# Patient Record
Sex: FEMALE | Race: WHITE | NOT HISPANIC OR LATINO | URBAN - METROPOLITAN AREA
[De-identification: names, ages, dates, MRNs, and addresses within clinical notes are randomized per-mention and may not be internally consistent; named-entity substitution may affect disease eponyms.]

---

## 2019-02-07 ENCOUNTER — EMERGENCY (EMERGENCY)
Facility: HOSPITAL | Age: 29
LOS: 0 days | Discharge: HOME | End: 2019-02-07
Admitting: PHYSICIAN ASSISTANT

## 2019-02-07 VITALS
SYSTOLIC BLOOD PRESSURE: 127 MMHG | TEMPERATURE: 98 F | RESPIRATION RATE: 18 BRPM | HEART RATE: 97 BPM | DIASTOLIC BLOOD PRESSURE: 58 MMHG | OXYGEN SATURATION: 100 %

## 2019-02-07 DIAGNOSIS — H60.91 UNSPECIFIED OTITIS EXTERNA, RIGHT EAR: ICD-10-CM

## 2019-02-07 DIAGNOSIS — H92.09 OTALGIA, UNSPECIFIED EAR: ICD-10-CM

## 2019-02-07 DIAGNOSIS — Z79.899 OTHER LONG TERM (CURRENT) DRUG THERAPY: ICD-10-CM

## 2019-02-07 RX ORDER — NEOMYCIN/POLYMYXIN B/HYDROCORT
2 SUSPENSION, DROPS(FINAL DOSAGE FORM)(ML) OTIC (EAR)
Qty: 1 | Refills: 0
Start: 2019-02-07 | End: 2019-02-13

## 2019-02-07 NOTE — ED PROVIDER NOTE - NSFOLLOWUPINSTRUCTIONS_ED_ALL_ED_FT
Otitis Externa    Otitis externa is a bacterial or fungal infection of the outer ear canal. This is the area from the eardrum to the outside of the ear. Otitis externa is sometimes called "swimmer's ear." Causes include swimming in dirty water, moisture remaining in ear after swimming or bathing, trauma to the ear, objects stuck in the ear, or cuts or scrapes in the outside of the ear. Symptoms include itching, pain, swelling, redness in the ear canal, and fluid coming from the ear. To prevent recurrence of otitis externa, keep your ear dry, avoid scratching or putting objects inside your ear including cotton swabs, and avoid swimming in polluted water or poorly chlorinated pools.    SEEK IMMEDIATE MEDICAL CARE IF YOU HAVE ANY OF THE FOLLOWING SYMPTOMS: fever, worsening symptoms, headache, redness/swelling/pain over the bone behind your ear.

## 2019-02-07 NOTE — ED PROVIDER NOTE - OBJECTIVE STATEMENT
pt presents c/o right ear pain for 2 weeks  pain is sharp, constant, nonradiating, without known exac or relieving factors  pt works as , so likely sick contacts, but not definite  denies recent travel  Denies fever/chill/HA/dizziness/chest pain/palpitation/sob/abd pain/n/v/d/ black stool/bloody stool/urinary sxs

## 2019-02-07 NOTE — ED PROVIDER NOTE - PROGRESS NOTE DETAILS
if hearing changes : see ENT right away or return to ER, discussed possible side effects of abx.   Meningitis, malignant otitis, mastoiditis, considered, not supported.

## 2019-02-07 NOTE — ED PROVIDER NOTE - PHYSICAL EXAMINATION
CONSTITUTIONAL: Well-appearing; well-nourished; in no apparent distress.   ENT: +right ear canal erythema and narrowing, nl TM without effusion or erythema; left ear wnl; normal nose; no rhinorrhea; normal pharynx with no tonsillar hypertrophy.   NECK: Supple; non-tender; no cervical lymphadenopathy. No JVD.   NEURO/PSYCH: A & O x 4; grossly unremarkable. mood and manner are appropriate. Grooming and personal hygiene are appropriate. No apparent thoughts of harm to self or others.

## 2019-02-07 NOTE — ED PROVIDER NOTE - NSFOLLOWUPCLINICS_GEN_ALL_ED_FT
An ENT Doctor  Otolaryngology (ENT)  .  [place]   Phone:   Fax:   Follow Up Time:     [facility]  ENT  [address], [address]  [place] [address]  Phone: [phone]  Fax:   Follow Up Time:

## 2019-02-08 PROBLEM — Z00.00 ENCOUNTER FOR PREVENTIVE HEALTH EXAMINATION: Status: ACTIVE | Noted: 2019-02-08

## 2019-10-21 NOTE — ED ADULT NURSE NOTE - PAIN: PRESENCE, MLM
OFFICE VISIT      Patient: Raysa Lilly Date of Service: 10/21/2019   : 1971 MRN: [id number]     SUBJECTIVE:     Chief Complaint   Patient presents with   • Follow-up     1 month f/u        HISTORY OF PRESENT ILLNESS:  Raysa Lilly is a 48 year old female who presents today for:    1. C/o swelling and pain in left hand similar to the one she had in 2019  The swelling is over base of 2nd and 3rd fingers  Had Xray of the left hand in 2019, negative for erosions  No prior h/o RA or other systemic form of arthritis  Knees and ankle hurt as well    2. HS-  Needs refill on norco  Needs refill on clindamycin oral and local for when having active lesions  Denies fever or any draining/active ones at present time    3. DM2  Has been taking basaglar 30units qhs and metformin bid  Insurance stopped paying for her contour tests strips  Needs a new rx for a new machine    Declines Flu shot      REVIEW OF SYSTEMS:  Review of Systems   All other systems reviewed and are negative.      MEDICATIONS:  Current Outpatient Medications   Medication Sig   • HYDROcodone-acetaminophen (NORCO)  MG per tablet Take 1 tablet by mouth every 8 hours as needed for Pain.   • predniSONE (DELTASONE) 10 MG tablet Take 4 tabl PO qd for 2 days, then 3 tabl PO qd for 2 days, then 2 tabl PO qd for 2 days, then 1 tabl PO qd for 2 days   • clindamycin (CLINDAGEL) 1 % gel Apply topically 2 times daily.   • clindamycin (CLEOCIN) 300 MG capsule Take 1 capsule by mouth 2 times daily for 14 days.   • ketoconazole (NIZORAL) 2 % shampoo apply to affected area, leave on for 5 min, rinse; apply 2x/wk   • ALPRAZolam (XANAX) 2 MG tablet Take 1 tablet by mouth 2 times daily as needed for Anxiety. TAKE 1 TABLET TWICE DAILY.   • fluticasone-salmeterol (AIRDUO RESPICLICK) 113-14 MCG/ACT inhaler Inhale 1 puff into the lungs 2 times daily.   • budesonide-formoterol (SYMBICORT) 160-4.5 MCG/ACT inhaler Inhale 2 puffs into the lungs 2 times daily.    • albuterol 108 (90 Base) MCG/ACT inhaler Inhale 2 puffs into the lungs every 4 hours as needed for Shortness of Breath or Wheezing.   • insulin glargine (LANTUS SOLOSTAR) 100 UNIT/ML pen-injector Inject 30 Units into the skin nightly.   • metformin (GLUCOPHAGE) 1000 MG tablet Take 1 tablet by mouth 2 times daily (with meals). TAKE 1 TABLET BY MOUTH TWICE DAILY   • atorvastatin (LIPITOR) 10 MG tablet Take 1 tablet by mouth daily. TAKE 1 TABLET BY MOUTH EVERY DAY   • blood glucose (ZAHRAA CONTOUR NEXT TEST) test strip TEST THREE TIMES A DAY   • LANCETS ULTRA FINE Misc USE AS DIRECTED.   • Insulin Syringe 31G X 5/16\" 0.5 ML Misc USE AS DIRECTED once a day with lantus insulin   • Blood Glucose Monitoring Suppl (ZAHRAA CONTOUR NEXT MONITOR) w/Device Kit USE AS DIRECTED.     No current facility-administered medications for this visit.         ALLERGIES:  ALLERGIES:   Allergen Reactions   • Vancomycin Other (See Comments)     Kidney problems   • Morphine Other (See Comments)   • Piperacillin-Tazobactam In D5w GI UPSET     Pt started becoming nauseous and vomited right after antibiotic was started       PAST MEDICAL HISTORY:  Past Medical History:   Diagnosis Date   • Type 2 diabetes mellitus (CMS/HCC)        PAST SURGICAL HISTORY:  Past Surgical History:   Procedure Laterality Date   •  section, classic     • Hysterectomy         FAMILY HISTORY:  No family history on file.    SOCIAL HISTORY:  Social History     Tobacco Use   • Smoking status: Current Some Day Smoker     Packs/day: 0.00   • Smokeless tobacco: Current User   Substance Use Topics   • Alcohol use: No     Frequency: Never   • Drug use: Not on file         OBJECTIVE:   Vital Signs:    Visit Vitals  /78 (BP Location: LUE - Left upper extremity, Patient Position: Sitting, Cuff Size: Regular)   Pulse 80   Ht 5' 6\" (1.676 m)   Wt 83 kg (182 lb 14 oz)   SpO2 97%   BMI 29.52 kg/m²       PHYSICAL EXAM :  Physical Exam   Constitutional: She appears  well-developed and well-nourished. No distress.   Cardiovascular: Normal rate, regular rhythm and normal heart sounds.   No murmur heard.  Pulmonary/Chest: Effort normal and breath sounds normal. No respiratory distress. She has no wheezes. Musculoskeletal:      Comments: Left hand - + soft tissue swelling around 2nd and 3rd MCP joints     Nursing note and vitals reviewed.          Assessment AND PLAN:   This is a 48 year old year-old female who presents with:    Polyarthropathy or polyarthritis of multiple sites  To see rheumatologist for evaluation  2nd episode of ? Synovitis vs tendonitis in hand  - SERVICE TO RHEUMATOLOGY IMMUNOLOGY  - norco prn  - prednisone taper, short course  - to watch her BGs closely, risk of hyperglycemia discussed    Hidradenitis suppurativa  Clindamycin 300 tid x 10 days refilled  To see surgeon or go to ED if any draining lesions  To discuss with rheumatologist chronic therapy    Type 2 diabetes mellitus with diabetic polyneuropathy, with long-term current use of insulin (CMS/[facility])  Last hga1c not at goal  Will repeat labs  To cont daily basaglar and metformin bid  To avoid added sugar in foods  Ophthalmologist annually    Vaccine refused by patient  Flu shot discussed, possible SEs reviewed  Pt declines vaccination    The patient indicated understanding of the diagnosis and agreed with the plan of care.    Return in about 4 weeks (around 11/18/2019).      Adele Hurley MD   complains of pain/discomfort

## 2019-10-27 ENCOUNTER — INPATIENT (INPATIENT)
Facility: HOSPITAL | Age: 29
LOS: 0 days | Discharge: HOME | End: 2019-10-28
Attending: INTERNAL MEDICINE | Admitting: INTERNAL MEDICINE
Payer: MEDICAID

## 2019-10-27 VITALS
TEMPERATURE: 98 F | SYSTOLIC BLOOD PRESSURE: 143 MMHG | OXYGEN SATURATION: 99 % | DIASTOLIC BLOOD PRESSURE: 74 MMHG | HEART RATE: 91 BPM | WEIGHT: 199.96 LBS | RESPIRATION RATE: 17 BRPM

## 2019-10-27 PROCEDURE — 99285 EMERGENCY DEPT VISIT HI MDM: CPT

## 2019-10-27 PROCEDURE — 93010 ELECTROCARDIOGRAM REPORT: CPT

## 2019-10-27 NOTE — ED ADULT NURSE NOTE - CHIEF COMPLAINT QUOTE
"I have neck pain for 3 days now; I feel bumps to the sides of my neck since last night. (+) on & off mild left chest pain. Denies trauma. No fever.

## 2019-10-27 NOTE — ED ADULT NURSE NOTE - OBJECTIVE STATEMENT
pt states she noticed bump to left back of head x3 days ago, reports tenderness and pain, denies fever, n/v/d, sweats, chills

## 2019-10-27 NOTE — ED ADULT TRIAGE NOTE - CHIEF COMPLAINT QUOTE
"I have neck pain for 3 days now; I feel bumps to the sides of my neck since last night. (+) on & off mild left chest pain. "I have neck pain for 3 days now; I feel bumps to the sides of my neck since last night. (+) on & off mild left chest pain. Denies trauma. No fever.

## 2019-10-28 ENCOUNTER — TRANSCRIPTION ENCOUNTER (OUTPATIENT)
Age: 29
End: 2019-10-28

## 2019-10-28 VITALS
DIASTOLIC BLOOD PRESSURE: 69 MMHG | TEMPERATURE: 97 F | OXYGEN SATURATION: 97 % | SYSTOLIC BLOOD PRESSURE: 113 MMHG | HEART RATE: 73 BPM | RESPIRATION RATE: 18 BRPM

## 2019-10-28 DIAGNOSIS — Z98.84 BARIATRIC SURGERY STATUS: Chronic | ICD-10-CM

## 2019-10-28 PROBLEM — F32.9 MAJOR DEPRESSIVE DISORDER, SINGLE EPISODE, UNSPECIFIED: Chronic | Status: ACTIVE | Noted: 2019-02-07

## 2019-10-28 LAB
ABO RH CONFIRMATION: SIGNIFICANT CHANGE UP
ALBUMIN SERPL ELPH-MCNC: 4.4 G/DL — SIGNIFICANT CHANGE UP (ref 3.5–5.2)
ALP SERPL-CCNC: 76 U/L — SIGNIFICANT CHANGE UP (ref 30–115)
ALT FLD-CCNC: 15 U/L — SIGNIFICANT CHANGE UP (ref 0–41)
ANION GAP SERPL CALC-SCNC: 13 MMOL/L — SIGNIFICANT CHANGE UP (ref 7–14)
ANISOCYTOSIS BLD QL: SIGNIFICANT CHANGE UP
AST SERPL-CCNC: 20 U/L — SIGNIFICANT CHANGE UP (ref 0–41)
BASOPHILS # BLD AUTO: 0 K/UL — SIGNIFICANT CHANGE UP (ref 0–0.2)
BASOPHILS # BLD AUTO: 0.03 K/UL — SIGNIFICANT CHANGE UP (ref 0–0.2)
BASOPHILS NFR BLD AUTO: 0 % — SIGNIFICANT CHANGE UP (ref 0–1)
BASOPHILS NFR BLD AUTO: 0.3 % — SIGNIFICANT CHANGE UP (ref 0–1)
BILIRUB SERPL-MCNC: 0.2 MG/DL — SIGNIFICANT CHANGE UP (ref 0.2–1.2)
BLD GP AB SCN SERPL QL: SIGNIFICANT CHANGE UP
BUN SERPL-MCNC: 9 MG/DL — LOW (ref 10–20)
CALCIUM SERPL-MCNC: 8.9 MG/DL — SIGNIFICANT CHANGE UP (ref 8.5–10.1)
CHLORIDE SERPL-SCNC: 100 MMOL/L — SIGNIFICANT CHANGE UP (ref 98–110)
CO2 SERPL-SCNC: 24 MMOL/L — SIGNIFICANT CHANGE UP (ref 17–32)
CREAT SERPL-MCNC: 0.6 MG/DL — LOW (ref 0.7–1.5)
EOSINOPHIL # BLD AUTO: 0.1 K/UL — SIGNIFICANT CHANGE UP (ref 0–0.7)
EOSINOPHIL # BLD AUTO: 0.14 K/UL — SIGNIFICANT CHANGE UP (ref 0–0.7)
EOSINOPHIL NFR BLD AUTO: 1 % — SIGNIFICANT CHANGE UP (ref 0–8)
EOSINOPHIL NFR BLD AUTO: 1.7 % — SIGNIFICANT CHANGE UP (ref 0–8)
FERRITIN SERPL-MCNC: 3 NG/ML — LOW (ref 15–150)
GIANT PLATELETS BLD QL SMEAR: PRESENT — SIGNIFICANT CHANGE UP
GLUCOSE SERPL-MCNC: 94 MG/DL — SIGNIFICANT CHANGE UP (ref 70–99)
HCG SERPL QL: NEGATIVE — SIGNIFICANT CHANGE UP
HCG SERPL QL: NEGATIVE — SIGNIFICANT CHANGE UP
HCT VFR BLD CALC: 23 % — LOW (ref 37–47)
HCT VFR BLD CALC: 24 % — LOW (ref 37–47)
HCT VFR BLD CALC: 28.2 % — LOW (ref 37–47)
HCT VFR BLD CALC: 29.4 % — LOW (ref 37–47)
HGB BLD-MCNC: 6 G/DL — CRITICAL LOW (ref 12–16)
HGB BLD-MCNC: 6.3 G/DL — CRITICAL LOW (ref 12–16)
HGB BLD-MCNC: 7.9 G/DL — LOW (ref 12–16)
HGB BLD-MCNC: 8.4 G/DL — LOW (ref 12–16)
HYPOCHROMIA BLD QL: SIGNIFICANT CHANGE UP
IMM GRANULOCYTES NFR BLD AUTO: 0.4 % — HIGH (ref 0.1–0.3)
IRON SATN MFR SERPL: 18 UG/DL — LOW (ref 35–150)
IRON SATN MFR SERPL: 4 % — LOW (ref 15–50)
LACTATE SERPL-SCNC: 0.5 MMOL/L — SIGNIFICANT CHANGE UP (ref 0.5–2.2)
LYMPHOCYTES # BLD AUTO: 2.51 K/UL — SIGNIFICANT CHANGE UP (ref 1.2–3.4)
LYMPHOCYTES # BLD AUTO: 2.73 K/UL — SIGNIFICANT CHANGE UP (ref 1.2–3.4)
LYMPHOCYTES # BLD AUTO: 28.2 % — SIGNIFICANT CHANGE UP (ref 20.5–51.1)
LYMPHOCYTES # BLD AUTO: 29.6 % — SIGNIFICANT CHANGE UP (ref 20.5–51.1)
MANUAL SMEAR VERIFICATION: SIGNIFICANT CHANGE UP
MCHC RBC-ENTMCNC: 17 PG — LOW (ref 27–31)
MCHC RBC-ENTMCNC: 17.1 PG — LOW (ref 27–31)
MCHC RBC-ENTMCNC: 19.3 PG — LOW (ref 27–31)
MCHC RBC-ENTMCNC: 19.8 PG — LOW (ref 27–31)
MCHC RBC-ENTMCNC: 26.1 G/DL — LOW (ref 32–37)
MCHC RBC-ENTMCNC: 26.3 G/DL — LOW (ref 32–37)
MCHC RBC-ENTMCNC: 28 G/DL — LOW (ref 32–37)
MCHC RBC-ENTMCNC: 28.6 G/DL — LOW (ref 32–37)
MCV RBC AUTO: 65 FL — LOW (ref 81–99)
MCV RBC AUTO: 65.3 FL — LOW (ref 81–99)
MCV RBC AUTO: 68.8 FL — LOW (ref 81–99)
MCV RBC AUTO: 69.2 FL — LOW (ref 81–99)
MICROCYTES BLD QL: SIGNIFICANT CHANGE UP
MONOCYTES # BLD AUTO: 0.22 K/UL — SIGNIFICANT CHANGE UP (ref 0.1–0.6)
MONOCYTES # BLD AUTO: 0.61 K/UL — HIGH (ref 0.1–0.6)
MONOCYTES NFR BLD AUTO: 2.6 % — SIGNIFICANT CHANGE UP (ref 1.7–9.3)
MONOCYTES NFR BLD AUTO: 6.3 % — SIGNIFICANT CHANGE UP (ref 1.7–9.3)
NEUTROPHILS # BLD AUTO: 5.6 K/UL — SIGNIFICANT CHANGE UP (ref 1.4–6.5)
NEUTROPHILS # BLD AUTO: 6.17 K/UL — SIGNIFICANT CHANGE UP (ref 1.4–6.5)
NEUTROPHILS NFR BLD AUTO: 63.8 % — SIGNIFICANT CHANGE UP (ref 42.2–75.2)
NEUTROPHILS NFR BLD AUTO: 66.1 % — SIGNIFICANT CHANGE UP (ref 42.2–75.2)
NRBC # BLD: 0 /100 WBCS — SIGNIFICANT CHANGE UP (ref 0–0)
NT-PROBNP SERPL-SCNC: 94 PG/ML — SIGNIFICANT CHANGE UP (ref 0–300)
PLAT MORPH BLD: NORMAL — SIGNIFICANT CHANGE UP
PLATELET # BLD AUTO: 180 K/UL — SIGNIFICANT CHANGE UP (ref 130–400)
PLATELET # BLD AUTO: 184 K/UL — SIGNIFICANT CHANGE UP (ref 130–400)
PLATELET # BLD AUTO: 210 K/UL — SIGNIFICANT CHANGE UP (ref 130–400)
PLATELET # BLD AUTO: 212 K/UL — SIGNIFICANT CHANGE UP (ref 130–400)
POIKILOCYTOSIS BLD QL AUTO: SIGNIFICANT CHANGE UP
POLYCHROMASIA BLD QL SMEAR: SLIGHT — SIGNIFICANT CHANGE UP
POTASSIUM SERPL-MCNC: 3.8 MMOL/L — SIGNIFICANT CHANGE UP (ref 3.5–5)
POTASSIUM SERPL-SCNC: 3.8 MMOL/L — SIGNIFICANT CHANGE UP (ref 3.5–5)
PROT SERPL-MCNC: 7.4 G/DL — SIGNIFICANT CHANGE UP (ref 6–8)
RBC # BLD: 3.52 M/UL — LOW (ref 4.2–5.4)
RBC # BLD: 3.69 M/UL — LOW (ref 4.2–5.4)
RBC # BLD: 4.1 M/UL — LOW (ref 4.2–5.4)
RBC # BLD: 4.25 M/UL — SIGNIFICANT CHANGE UP (ref 4.2–5.4)
RBC # FLD: 19.3 % — HIGH (ref 11.5–14.5)
RBC # FLD: 19.4 % — HIGH (ref 11.5–14.5)
RBC # FLD: 21.9 % — HIGH (ref 11.5–14.5)
RBC # FLD: 22.1 % — HIGH (ref 11.5–14.5)
RBC BLD AUTO: NORMAL — SIGNIFICANT CHANGE UP
SODIUM SERPL-SCNC: 137 MMOL/L — SIGNIFICANT CHANGE UP (ref 135–146)
TIBC SERPL-MCNC: 502 UG/DL — HIGH (ref 220–430)
TROPONIN T SERPL-MCNC: <0.01 NG/ML — SIGNIFICANT CHANGE UP
UIBC SERPL-MCNC: 484 UG/DL — HIGH (ref 110–370)
WBC # BLD: 7.53 K/UL — SIGNIFICANT CHANGE UP (ref 4.8–10.8)
WBC # BLD: 7.67 K/UL — SIGNIFICANT CHANGE UP (ref 4.8–10.8)
WBC # BLD: 8.47 K/UL — SIGNIFICANT CHANGE UP (ref 4.8–10.8)
WBC # BLD: 9.68 K/UL — SIGNIFICANT CHANGE UP (ref 4.8–10.8)
WBC # FLD AUTO: 7.53 K/UL — SIGNIFICANT CHANGE UP (ref 4.8–10.8)
WBC # FLD AUTO: 7.67 K/UL — SIGNIFICANT CHANGE UP (ref 4.8–10.8)
WBC # FLD AUTO: 8.47 K/UL — SIGNIFICANT CHANGE UP (ref 4.8–10.8)
WBC # FLD AUTO: 9.68 K/UL — SIGNIFICANT CHANGE UP (ref 4.8–10.8)

## 2019-10-28 PROCEDURE — 71046 X-RAY EXAM CHEST 2 VIEWS: CPT | Mod: 26

## 2019-10-28 PROCEDURE — 99236 HOSP IP/OBS SAME DATE HI 85: CPT

## 2019-10-28 PROCEDURE — 76856 US EXAM PELVIC COMPLETE: CPT | Mod: 26

## 2019-10-28 PROCEDURE — 76536 US EXAM OF HEAD AND NECK: CPT | Mod: 26

## 2019-10-28 RX ORDER — THIAMINE MONONITRATE (VIT B1) 100 MG
100 TABLET ORAL DAILY
Refills: 0 | Status: DISCONTINUED | OUTPATIENT
Start: 2019-10-28 | End: 2019-10-28

## 2019-10-28 RX ORDER — ENOXAPARIN SODIUM 100 MG/ML
40 INJECTION SUBCUTANEOUS DAILY
Refills: 0 | Status: DISCONTINUED | OUTPATIENT
Start: 2019-10-28 | End: 2019-10-28

## 2019-10-28 RX ORDER — IBUPROFEN 200 MG
600 TABLET ORAL ONCE
Refills: 0 | Status: COMPLETED | OUTPATIENT
Start: 2019-10-28 | End: 2019-10-28

## 2019-10-28 RX ORDER — CEPHALEXIN 500 MG
1 CAPSULE ORAL
Qty: 28 | Refills: 0
Start: 2019-10-28 | End: 2019-11-03

## 2019-10-28 RX ORDER — CHLORHEXIDINE GLUCONATE 213 G/1000ML
1 SOLUTION TOPICAL
Refills: 0 | Status: DISCONTINUED | OUTPATIENT
Start: 2019-10-28 | End: 2019-10-28

## 2019-10-28 RX ORDER — SERTRALINE 25 MG/1
0 TABLET, FILM COATED ORAL
Qty: 0 | Refills: 0 | DISCHARGE

## 2019-10-28 RX ORDER — IRON SUCROSE 20 MG/ML
200 INJECTION, SOLUTION INTRAVENOUS ONCE
Refills: 0 | Status: COMPLETED | OUTPATIENT
Start: 2019-10-28 | End: 2019-10-28

## 2019-10-28 RX ORDER — FERROUS SULFATE 325(65) MG
1 TABLET ORAL
Qty: 30 | Refills: 0
Start: 2019-10-28 | End: 2019-11-26

## 2019-10-28 RX ORDER — FOLIC ACID 0.8 MG
1 TABLET ORAL DAILY
Refills: 0 | Status: DISCONTINUED | OUTPATIENT
Start: 2019-10-28 | End: 2019-10-28

## 2019-10-28 RX ORDER — CEFAZOLIN SODIUM 1 G
2000 VIAL (EA) INJECTION ONCE
Refills: 0 | Status: COMPLETED | OUTPATIENT
Start: 2019-10-28 | End: 2019-10-28

## 2019-10-28 RX ORDER — INFLUENZA VIRUS VACCINE 15; 15; 15; 15 UG/.5ML; UG/.5ML; UG/.5ML; UG/.5ML
0.5 SUSPENSION INTRAMUSCULAR ONCE
Refills: 0 | Status: DISCONTINUED | OUTPATIENT
Start: 2019-10-28 | End: 2019-10-28

## 2019-10-28 RX ADMIN — Medication 600 MILLIGRAM(S): at 02:00

## 2019-10-28 RX ADMIN — Medication 1 TABLET(S): at 11:34

## 2019-10-28 RX ADMIN — Medication 100 MILLIGRAM(S): at 11:34

## 2019-10-28 RX ADMIN — IRON SUCROSE 110 MILLIGRAM(S): 20 INJECTION, SOLUTION INTRAVENOUS at 12:55

## 2019-10-28 RX ADMIN — Medication 100 MILLIGRAM(S): at 05:41

## 2019-10-28 RX ADMIN — ENOXAPARIN SODIUM 40 MILLIGRAM(S): 100 INJECTION SUBCUTANEOUS at 11:33

## 2019-10-28 RX ADMIN — Medication 600 MILLIGRAM(S): at 00:54

## 2019-10-28 RX ADMIN — Medication 1 MILLIGRAM(S): at 11:34

## 2019-10-28 NOTE — CHART NOTE - NSCHARTNOTEFT_GEN_A_CORE
<<<RESIDENT DISCHARGE NOTE>>>     ANN MARIE PÉREZ  MRN-[id number]    VITAL SIGNS:  T(F): 97.2 (10-28-19 @ 15:51), Max: 98.5 (10-27-19 @ 22:59)  HR: 73 (10-28-19 @ 15:51)  BP: 113/69 (10-28-19 @ 15:51)  SpO2: 97% (10-28-19 @ 15:51)  Weight (kg): 90.7 (10-27-19 @ 22:59)    PHYSICAL EXAMINATION:  General:  NAD  Head & Neck: NC/AT   Pulmonary: CTA BL  Cardiovascular: RRR   Gastrointestinal/Abdomen & Pelvis:  Soft, NT/ND  Neurologic/Motor: Non focal     TEST RESULTS:                        8.4    7.53  )-----------( 184      ( 28 Oct 2019 12:28 )             29.4       10-28    137  |  100  |  9<L>  ----------------------------<  94  3.8   |  24  |  0.6<L>    Ca    8.9      28 Oct 2019 00:20  Mg     2.0     10-28    TPro  7.4  /  Alb  4.4  /  TBili  0.2  /  DBili  x   /  AST  20  /  ALT  15  /  AlkPhos  76  10-28      FINAL DISCHARGE INTERVIEW:  Resident(s) Present: (Name: Gigi) RN Present: (Name:  )    DISCHARGE MEDICATION RECONCILIATION  reviewed with Attending (Name: Deny)    DISPOSITION:   [ x ] Home,    [  ] Home with Visiting Nursing Services,   [    ]  SNF/ NH,    [   ] Acute Rehab (4A),   [   ] Other (Specify:_________)

## 2019-10-28 NOTE — H&P ADULT - NSHPPHYSICALEXAM_GEN_ALL_CORE
GEN: No acute distress, NC/AT, anicteric  Head: Noted are 2 dry lesions on the scalp of the back of head, 1 has erythema surroiding with dry stallworth scaling, the other is smaller in size with no scaling.  Neck: Painful non mobile palpated cervical LN on the R and L, no supraclavicular LN, no axillary LN.   LUNGS: Clear to auscultation bilaterally, no wheezes  HEART: S1/S2 present. RRR.   ABD: Soft, non-tender, non-distended. Bowel sounds present. No inguinal LAD  EXT: No LE edema  NEURO: AAOX3    Intravenous access: peripheral access  NG tube: None  Martin Catheter: None

## 2019-10-28 NOTE — DISCHARGE NOTE PROVIDER - NSDCCPCAREPLAN_GEN_ALL_CORE_FT
PRINCIPAL DISCHARGE DIAGNOSIS  Diagnosis: Anemia  Assessment and Plan of Treatment: You have iron deficiency anemia, you will need to get IV iron as an outpatient.  Please follow up with your PMD in 1-2 weeks.      SECONDARY DISCHARGE DIAGNOSES  Diagnosis: Furuncle of scalp  Assessment and Plan of Treatment: Please continue taking Kefflex for 7 days.  Please follow up with your PMD in 1-2 weeks. PRINCIPAL DISCHARGE DIAGNOSIS  Diagnosis: Anemia  Assessment and Plan of Treatment: You have iron deficiency anemia, you will need to take oral iron as an outpatient.  Please follow up with your PMD in 1-2 weeks.      SECONDARY DISCHARGE DIAGNOSES  Diagnosis: Furuncle of scalp  Assessment and Plan of Treatment: Please continue taking Kefflex for 7 days.  Please follow up with your PMD in 1-2 weeks.

## 2019-10-28 NOTE — ED PROVIDER NOTE - PHYSICAL EXAMINATION
Physical Exam    Vital Signs: I have reviewed the initial vital signs  Constitutional: well-nourished, appears stated age, no acute distress  EENT: Conjunctiva pink, Sclera clear, PERRLA, EOMI. Mucous membranes moist, no exudates or lesions noted, uvula midline.  Cardiovascular: S1 and S2 present, regular rate, regular rhythm. Well perfused extremities, no peripheral edema  Respiratory: unlabored respiratory effort, clear to auscultation bilaterally no wheezing, rales or rhonchi  Musculoskeletal: supple nontender neck, no midline tenderness, no joint pain. palpable right chest wall tenderness  Integumentary: warm, dry, no rash. 2 x 2 cm furuncle noted on occiput, TTP. shoddy occipital lymphadenopathy.  Psychiatric: appropriate mood, appropriate affect Physical Exam    Vital Signs: I have reviewed the initial vital signs  Constitutional: well-nourished, appears stated age, no acute distress  EENT: Conjunctiva pink, Sclera clear, PERRLA, EOMI. Mucous membranes moist, no exudates or lesions noted, uvula midline.  Cardiovascular: S1 and S2 present, regular rate, regular rhythm. Well perfused extremities, no peripheral edema  Respiratory: unlabored respiratory effort, clear to auscultation bilaterally no wheezing, rales or rhonchi  Musculoskeletal: supple nontender neck, no midline tenderness, no joint pain. palpable right chest wall tenderness  Integumentary: warm, dry, no rash. 2 x 2 cm furuncle noted on occiput, TTP. shoddy occipital lymphadenopathy.  Rectal: No bleeding, rash, or lesions noted externally. Sphincter tone intact. Brown stool no blood.   Chaperone: Rosaura Zayas  Psychiatric: appropriate mood, appropriate affect

## 2019-10-28 NOTE — DISCHARGE NOTE NURSING/CASE MANAGEMENT/SOCIAL WORK - PATIENT PORTAL LINK FT
You can access the FollowMyHealth Patient Portal offered by [facility] by registering at the following website: [email or web address]. By joining [employer]’s FollowMyHealth portal, you will also be able to view your health information using other applications (apps) compatible with our system.

## 2019-10-28 NOTE — ED PROVIDER NOTE - PROGRESS NOTE DETAILS
cbc with hgb 6.3.  pt states she has had anemia in the past, doesn't know levels, but has never needed a blood transfusion.  denies any black or bloody stools, states they have been normal brown.  has never been scoped before. does have heavy periods, but LMP 2 week ago.  to recheck cbc and send t&s. repeat hgb 6.0.  will transfuse 2 units prbc's and admit for anemia w/u.  rectal exam in ER neg for gross blood.

## 2019-10-28 NOTE — H&P ADULT - HISTORY OF PRESENT ILLNESS
30 y/o F G0 with PSHx of gastric bypass in 2012, no PMHx presents for pimple on the back of her head. She said that it appeared 3 days ago and was painful and it popped , pus came out. Currently she feels pain at this site. She also said that she had a pimple that popped on her left thigh. She endroses left sided intermittent chest pain that radiates to the back that if localized to her upper chest, not related to exertion. She denies fever/chills. ROS is otherwise negative. She is sexually active with one partner and does not use condoms, she has an IUD. She was found to have Hb of 6 with low MCV  She is s/p cefazolin IV in ED

## 2019-10-28 NOTE — H&P ADULT - ATTENDING COMMENTS
Patient seen and examined independently. Agree with resident note with exceptions.     # Furuncle with mild cervical lymphadenopathy  - S/p IV ancef in ER  - start keflex 500 mg q6 for 7 days    # Patient seen and examined independently. Agree with resident note with exceptions.     # Furuncle with mild cervical lymphadenopathy  - S/p IV ancef in ER  - start keflex 500 mg q6 for 7 days    #Iron deficiency anemia with Menorrhagia , H/o gastric bypass  -S/p 2 units PRBC  - monitor cbc, F/u B12, folate.  - pelvic ultrasound.    # H/o ETOH abuse  - not withdrawing  -start  thiamine, folic acid    # DVT prophylaxix- pt is low risk, discontinue lovenox    # Full code    # Pending: F/u cbc, pelvic US  # Discussed with patient  # Disposition: home

## 2019-10-28 NOTE — ED PROVIDER NOTE - NS ED ROS FT
Review of Systems         Constitutional: (-) fever (-) chills (-) weakness       EENT: (-) sore throat (-) congestion       Cardiovascular: (+) chest pain (-) syncope       Respiratory: (-) cough, (-) shortness of breath       Gastrointestinal: (-) abdominal pain (-) vomiting (-) diarrhea (-) nausea (-) constipation       Genitourinary: (-) dysuria       Musculoskeletal: (+) neck pain (-) back pain (-) joint pain       Integumentary: (-) rash       Neurological: (-) headache (-) altered mental status (-) dizziness       Allergic/Immunologic: (+) pruritus       Psych: (-) psych history

## 2019-10-28 NOTE — ED PROVIDER NOTE - OBJECTIVE STATEMENT
29 year old female no past medical history presenting with occipital pain x 3 days. patient state she noticed a bump on the back of her head 3 days ago. She denies fevers, chills, shortness of breath, nausea, vomiting. Denies hemoptysis, history of blood clots, recent surgery/trauma, recent travel, hormone use, leg swelling. pain mild, worse with palpation, no palliation. Patient also admits to intermittent chest pain x 2 weeks. Worse with touch, no pall/prov factors, non-radiating.

## 2019-10-28 NOTE — H&P ADULT - NSHPSOCIALHISTORY_GEN_ALL_CORE
- Non smoker  - Daily alcohol use ( 2 glasses of beer at night)  - No current use of drugs but remote use of cocaine  - Works as  at night and drinks at the bar  - Sexually active with 1 partner, heterosexual, no use of condoms, has IUD

## 2019-10-28 NOTE — H&P ADULT - ASSESSMENT
30 y/o F G0 with PSHx of gastric bypass in 2012, no PMHx admitted for anemia and erythema on her scalp    # Ruptured pimple with scaling on the back of the head:  - Surrounding erythema, dry scale  - No further interventions, no need for IV antibiotics, no sepsis on admission ( no fever/chills)    # Microcytic anemia - new:  - Likely caused by ROMERO due to gastric bypass  - WIll transfuse 2 U of pRBCs  - F/up iron studies, B12, folate  - WIll likely need IV iron for 15 days every other day since she has malabsorption    # DVT ppx: Lovenox 40 mg qd  # GI ppx: None  # DIet: regular  # ACtivity: ambulate as tolerated  # Dispo: From home  # Code status: FULL 30 y/o F G0 with PSHx of gastric bypass in 2012, no PMHx admitted for anemia and erythema on her scalp    # Ruptured pimple with scaling on the back of the head with cervical LAD:  - Surrounding erythema, dry scale  - F/up US neck and soft tissues to characterize LN  - No further interventions, no need for IV antibiotics, no sepsis on admission ( no fever/chills)    # Microcytic anemia - new:  - Likely caused by ROMERO due to gastric bypass  - WIll transfuse 2 U of pRBCs  - F/up iron studies, B12, folate  - WIll likely need IV iron for 15 days every other day since she has malabsorption    # DVT ppx: Lovenox 40 mg qd  # GI ppx: None  # DIet: regular  # ACtivity: ambulate as tolerated  # Dispo: From home  # Code status: FULL

## 2019-10-28 NOTE — DISCHARGE NOTE PROVIDER - HOSPITAL COURSE
28 y/o F G0 with PSHx of gastric bypass in 2012, no PMHx presented for pimple on the back of her head.  She endorsed left sided intermittent chest pain that radiates to the back that if localized to her upper chest, not related to exertion. She was found to have Hb of 6 with low MCV of 65 (Mentzer score- 18).  She received cefazolin IV in ED, and will be discharged on PO Kefflex as well as IV Iron as an outpatient. 28 y/o F G0 with PSHx of gastric bypass in 2012, no PMHx presented for pimple on the back of her head.  She endorsed left sided intermittent chest pain that radiates to the back that if localized to her upper chest, not related to exertion. She was found to have Hb of 6 with low MCV of 65 (Mentzer score- 18).  She received cefazolin IV in ED, and will be discharged on PO Kefflex as well as PO Iron as an outpatient. 30 y/o F G0 with PSHx of gastric bypass in 2012, no PMHx presented for pimple on the back of her head.  She endorsed left sided intermittent chest pain that radiates to the back that if localized to her upper chest, not related to exertion. She was found to have Hb of 6 with low MCV of 65 (Mentzer score- 18).  She received cefazolin IV in ED, and will be discharged on PO Kefflex as well as PO Iron as an outpatient.         # Furuncle with mild cervical lymphadenopathy    - S/p IV ancef in ER    - c/w keflex 500 mg q6 for 7 days        #Iron deficiency anemia with Menorrhagia , H/o gastric bypass    -S/p 2 units PRBC    - c/w  ferous sulphate    - US Pelvis Complete (10.28.19 @ 13:00) >Unremarkable transabdominal pelvic ultrasound.        # H/o ETOH abuse    - not withdrawing

## 2019-10-28 NOTE — H&P ADULT - NSHPLABSRESULTS_GEN_ALL_CORE
6.0    9.68  )-----------( 210      ( 28 Oct 2019 01:20 )             23.0       10-28    137  |  100  |  9<L>  ----------------------------<  94  3.8   |  24  |  0.6<L>    Ca    8.9      28 Oct 2019 00:20    TPro  7.4  /  Alb  4.4  /  TBili  0.2  /  DBili  x   /  AST  20  /  ALT  15  /  AlkPhos  76  10-28                      Lactate Trend  10-28 @ 00:20 Lactate:0.5       CARDIAC MARKERS ( 28 Oct 2019 00:20 )  x     / <0.01 ng/mL / x     / x     / x            CAPILLARY BLOOD GLUCOSE

## 2019-10-28 NOTE — H&P ADULT - NSHPREVIEWOFSYSTEMS_GEN_ALL_CORE
CONSTITUTIONAL: No weakness, fevers or chills  EYES/ENT: Pain in back of the head  RESPIRATORY: No cough, wheezing, hemoptysis; No shortness of breath  CARDIOVASCULAR: intermittent chest pain  GASTROINTESTINAL: No abdominal or epigastric pain. No nausea, vomiting, or hematemesis; No diarrhea or constipation. No melena or hematochezia.  GENITOURINARY: No dysuria, frequency or hematuria  NEUROLOGICAL: No numbness or weakness  SKIN: No itching, rashes

## 2019-10-28 NOTE — ED PROVIDER NOTE - ATTENDING CONTRIBUTION TO CARE
28 y/o female with h/o gastric bypass in '11 (denies complications, on MVI), in ER with c/o  swollen area to back of neck for the past 3 days.  thinks may be infected hair follicle.  no drainage to area.  no f/c.  also has some swollen LN's to L lateral neck.  also c/o some CP off and on for the past 3 weeks.  non-exertional.  denies any SOB.  no cough.  no abd pain.  no n/v/d.  no leg swelling.  states she had a small pimple to her L thigh that drained a little pus yesterday.  non-smoker.  PE - nad, nc/at, eomi, perrl, op - clear, mmm, post scalp - + ~ 2 X 2 cm area of bogginess with ? central scab, no drainage, + L cervical CARLOS, cta b/l, no w/r/r, rrr, abd- soft, nt/nd, nabs, from x 4, A&O x 3, no focal neuro deficits.  -check labs, cxr, cardiac w/u. abx

## 2019-10-28 NOTE — ED PROVIDER NOTE - CARE PLAN
Principal Discharge DX:	Anemia  Secondary Diagnosis:	Furuncle of scalp  Secondary Diagnosis:	Chest pain

## 2019-10-29 LAB
FOLATE SERPL-MCNC: 10.1 NG/ML — SIGNIFICANT CHANGE UP
FOLATE SERPL-MCNC: >20 NG/ML — SIGNIFICANT CHANGE UP
TSH SERPL-MCNC: 1.54 UIU/ML — SIGNIFICANT CHANGE UP (ref 0.27–4.2)
TSH SERPL-MCNC: 1.56 UIU/ML — SIGNIFICANT CHANGE UP (ref 0.27–4.2)
VIT B12 SERPL-MCNC: 236 PG/ML — SIGNIFICANT CHANGE UP (ref 232–1245)
VIT B12 SERPL-MCNC: 255 PG/ML — SIGNIFICANT CHANGE UP (ref 232–1245)

## 2019-10-31 DIAGNOSIS — D50.9 IRON DEFICIENCY ANEMIA, UNSPECIFIED: ICD-10-CM

## 2019-10-31 DIAGNOSIS — N92.0 EXCESSIVE AND FREQUENT MENSTRUATION WITH REGULAR CYCLE: ICD-10-CM

## 2019-10-31 DIAGNOSIS — L02.821 FURUNCLE OF HEAD [ANY PART, EXCEPT FACE]: ICD-10-CM

## 2019-10-31 DIAGNOSIS — F10.10 ALCOHOL ABUSE, UNCOMPLICATED: ICD-10-CM

## 2019-10-31 DIAGNOSIS — D64.9 ANEMIA, UNSPECIFIED: ICD-10-CM

## 2019-10-31 DIAGNOSIS — F32.9 MAJOR DEPRESSIVE DISORDER, SINGLE EPISODE, UNSPECIFIED: ICD-10-CM

## 2019-10-31 DIAGNOSIS — R59.9 ENLARGED LYMPH NODES, UNSPECIFIED: ICD-10-CM

## 2019-11-01 ENCOUNTER — OUTPATIENT (OUTPATIENT)
Dept: OUTPATIENT SERVICES | Facility: HOSPITAL | Age: 29
LOS: 1 days | End: 2019-11-01
Payer: MEDICAID

## 2019-11-01 DIAGNOSIS — Z98.84 BARIATRIC SURGERY STATUS: Chronic | ICD-10-CM

## 2019-11-01 PROCEDURE — G9001: CPT

## 2019-11-14 DIAGNOSIS — Z71.89 OTHER SPECIFIED COUNSELING: ICD-10-CM

## 2020-08-26 ENCOUNTER — TRANSCRIPTION ENCOUNTER (OUTPATIENT)
Age: 30
End: 2020-08-26

## 2020-08-28 ENCOUNTER — TRANSCRIPTION ENCOUNTER (OUTPATIENT)
Age: 30
End: 2020-08-28

## 2021-05-01 ENCOUNTER — OUTPATIENT (OUTPATIENT)
Dept: OUTPATIENT SERVICES | Facility: HOSPITAL | Age: 31
LOS: 1 days | End: 2021-05-01
Payer: MEDICAID

## 2021-05-01 DIAGNOSIS — Z98.84 BARIATRIC SURGERY STATUS: Chronic | ICD-10-CM

## 2021-05-02 ENCOUNTER — TRANSCRIPTION ENCOUNTER (OUTPATIENT)
Age: 31
End: 2021-05-02

## 2021-05-04 ENCOUNTER — NON-APPOINTMENT (OUTPATIENT)
Age: 31
End: 2021-05-04

## 2021-05-04 ENCOUNTER — APPOINTMENT (OUTPATIENT)
Dept: CARDIOLOGY | Facility: CLINIC | Age: 31
End: 2021-05-04
Payer: MEDICAID

## 2021-05-04 ENCOUNTER — OUTPATIENT (OUTPATIENT)
Dept: OUTPATIENT SERVICES | Facility: HOSPITAL | Age: 31
LOS: 1 days | End: 2021-05-04
Payer: MEDICAID

## 2021-05-04 ENCOUNTER — TRANSCRIPTION ENCOUNTER (OUTPATIENT)
Age: 31
End: 2021-05-04

## 2021-05-04 VITALS
HEIGHT: 64 IN | HEART RATE: 82 BPM | BODY MASS INDEX: 34.83 KG/M2 | WEIGHT: 204 LBS | DIASTOLIC BLOOD PRESSURE: 79 MMHG | OXYGEN SATURATION: 95 % | SYSTOLIC BLOOD PRESSURE: 120 MMHG

## 2021-05-04 DIAGNOSIS — Z82.49 FAMILY HISTORY OF ISCHEMIC HEART DISEASE AND OTHER DISEASES OF THE CIRCULATORY SYSTEM: ICD-10-CM

## 2021-05-04 DIAGNOSIS — Z98.84 BARIATRIC SURGERY STATUS: Chronic | ICD-10-CM

## 2021-05-04 DIAGNOSIS — Z01.812 ENCOUNTER FOR PREPROCEDURAL LABORATORY EXAMINATION: ICD-10-CM

## 2021-05-04 LAB
ALBUMIN SERPL ELPH-MCNC: 3.8 G/DL — SIGNIFICANT CHANGE UP (ref 3.3–5)
ALP SERPL-CCNC: 78 U/L — SIGNIFICANT CHANGE UP (ref 30–120)
ALT FLD-CCNC: 31 U/L DA — SIGNIFICANT CHANGE UP (ref 10–60)
ANION GAP SERPL CALC-SCNC: 9 MMOL/L — SIGNIFICANT CHANGE UP (ref 5–17)
APTT BLD: 30.9 SEC — SIGNIFICANT CHANGE UP (ref 27.5–35.5)
AST SERPL-CCNC: 21 U/L — SIGNIFICANT CHANGE UP (ref 10–40)
BILIRUB SERPL-MCNC: 0.2 MG/DL — SIGNIFICANT CHANGE UP (ref 0.2–1.2)
BUN SERPL-MCNC: 8 MG/DL — SIGNIFICANT CHANGE UP (ref 7–23)
CALCIUM SERPL-MCNC: 8.7 MG/DL — SIGNIFICANT CHANGE UP (ref 8.4–10.5)
CHLORIDE SERPL-SCNC: 102 MMOL/L — SIGNIFICANT CHANGE UP (ref 96–108)
CO2 SERPL-SCNC: 26 MMOL/L — SIGNIFICANT CHANGE UP (ref 22–31)
CREAT SERPL-MCNC: 0.76 MG/DL — SIGNIFICANT CHANGE UP (ref 0.5–1.3)
GLUCOSE SERPL-MCNC: 93 MG/DL — SIGNIFICANT CHANGE UP (ref 70–99)
HCG SERPL-ACNC: <1 MIU/ML — SIGNIFICANT CHANGE UP
HCT VFR BLD CALC: 36.2 % — SIGNIFICANT CHANGE UP (ref 34.5–45)
HGB BLD-MCNC: 11 G/DL — LOW (ref 11.5–15.5)
INR BLD: 1.06 RATIO — SIGNIFICANT CHANGE UP (ref 0.88–1.16)
MCHC RBC-ENTMCNC: 23.3 PG — LOW (ref 27–34)
MCHC RBC-ENTMCNC: 30.4 GM/DL — LOW (ref 32–36)
MCV RBC AUTO: 76.5 FL — LOW (ref 80–100)
NRBC # BLD: 0 /100 WBCS — SIGNIFICANT CHANGE UP (ref 0–0)
PLATELET # BLD AUTO: 417 K/UL — HIGH (ref 150–400)
POTASSIUM SERPL-MCNC: 3.9 MMOL/L — SIGNIFICANT CHANGE UP (ref 3.5–5.3)
POTASSIUM SERPL-SCNC: 3.9 MMOL/L — SIGNIFICANT CHANGE UP (ref 3.5–5.3)
PROT SERPL-MCNC: 7.9 G/DL — SIGNIFICANT CHANGE UP (ref 6–8.3)
PROTHROM AB SERPL-ACNC: 12.8 SEC — SIGNIFICANT CHANGE UP (ref 10.6–13.6)
RBC # BLD: 4.73 M/UL — SIGNIFICANT CHANGE UP (ref 3.8–5.2)
RBC # FLD: 17.4 % — HIGH (ref 10.3–14.5)
SODIUM SERPL-SCNC: 137 MMOL/L — SIGNIFICANT CHANGE UP (ref 135–145)
WBC # BLD: 8.11 K/UL — SIGNIFICANT CHANGE UP (ref 3.8–10.5)
WBC # FLD AUTO: 8.11 K/UL — SIGNIFICANT CHANGE UP (ref 3.8–10.5)

## 2021-05-04 PROCEDURE — 85027 COMPLETE CBC AUTOMATED: CPT

## 2021-05-04 PROCEDURE — 85610 PROTHROMBIN TIME: CPT

## 2021-05-04 PROCEDURE — 84702 CHORIONIC GONADOTROPIN TEST: CPT

## 2021-05-04 PROCEDURE — 80053 COMPREHEN METABOLIC PANEL: CPT

## 2021-05-04 PROCEDURE — 36415 COLL VENOUS BLD VENIPUNCTURE: CPT

## 2021-05-04 PROCEDURE — 93000 ELECTROCARDIOGRAM COMPLETE: CPT | Mod: NC

## 2021-05-04 PROCEDURE — 99203 OFFICE O/P NEW LOW 30 MIN: CPT

## 2021-05-04 PROCEDURE — 85730 THROMBOPLASTIN TIME PARTIAL: CPT

## 2021-05-04 PROCEDURE — 99072 ADDL SUPL MATRL&STAF TM PHE: CPT

## 2021-05-04 NOTE — HISTORY OF PRESENT ILLNESS
[FreeTextEntry1] : 30 year old woman with a history of gastric bypass in 2012, s/p anemia presents for an initial cardiac evaluation. \par \par She is getting a breast augmentation, liposuction, and fat transfer. \par She jogs a few times a week. She spins dragon staff. She denies any anginal symptoms. \par She   denies any chest pain, PND, orthopnea, lower extremity edema, near syncope, syncope, strokelike symptoms. \par

## 2021-05-04 NOTE — DISCUSSION/SUMMARY
[FreeTextEntry1] : 30 year woman with a history as listed presents for an initial cardiac evaluation. \par Kiah is doing relatively well. She denies any anginal symptoms. Clinically she is euvolemic on exam. Her EKG did not reveal any significant ischemic changes.   She may proceed with the planned low risk plastic surgery without any further  workup. Routine hemodynamic monitoring is suggested during the procedure. Exercise and diet counseling was performed in order to reduce her future cardiovascular risk.\par She will followup with me as needed.

## 2021-05-28 DIAGNOSIS — Z71.89 OTHER SPECIFIED COUNSELING: ICD-10-CM

## 2021-11-01 PROCEDURE — G9005: CPT

## 2022-02-23 ENCOUNTER — OUTPATIENT (OUTPATIENT)
Dept: OUTPATIENT SERVICES | Facility: HOSPITAL | Age: 32
LOS: 1 days | Discharge: HOME | End: 2022-02-23
Payer: MEDICAID

## 2022-02-23 DIAGNOSIS — Z98.84 BARIATRIC SURGERY STATUS: Chronic | ICD-10-CM

## 2022-02-23 DIAGNOSIS — R92.8 OTHER ABNORMAL AND INCONCLUSIVE FINDINGS ON DIAGNOSTIC IMAGING OF BREAST: ICD-10-CM

## 2022-02-23 DIAGNOSIS — N63.10 UNSPECIFIED LUMP IN THE RIGHT BREAST, UNSPECIFIED QUADRANT: ICD-10-CM

## 2022-02-23 PROCEDURE — 76642 ULTRASOUND BREAST LIMITED: CPT | Mod: 26,50

## 2022-02-23 PROCEDURE — 77066 DX MAMMO INCL CAD BI: CPT | Mod: 26

## 2022-02-23 PROCEDURE — 77062 BREAST TOMOSYNTHESIS BI: CPT | Mod: 26

## 2022-02-24 ENCOUNTER — APPOINTMENT (OUTPATIENT)
Dept: BREAST CENTER | Facility: CLINIC | Age: 32
End: 2022-02-24
Payer: MEDICAID

## 2022-02-24 VITALS
DIASTOLIC BLOOD PRESSURE: 84 MMHG | WEIGHT: 204 LBS | BODY MASS INDEX: 34.83 KG/M2 | HEIGHT: 64 IN | TEMPERATURE: 97.5 F | SYSTOLIC BLOOD PRESSURE: 146 MMHG

## 2022-02-24 DIAGNOSIS — Z78.9 OTHER SPECIFIED HEALTH STATUS: ICD-10-CM

## 2022-02-24 PROCEDURE — 99203 OFFICE O/P NEW LOW 30 MIN: CPT | Mod: 25

## 2022-02-24 PROCEDURE — 10060 I&D ABSCESS SIMPLE/SINGLE: CPT

## 2022-02-26 PROBLEM — Z78.9 CURRENT NON-SMOKER: Status: ACTIVE | Noted: 2021-05-04

## 2022-02-26 NOTE — PHYSICAL EXAM
[Normocephalic] : normocephalic [Atraumatic] : atraumatic [EOMI] : extra ocular movement intact [No Supraclavicular Adenopathy] : no supraclavicular adenopathy [No Cervical Adenopathy] : no cervical adenopathy [No Axillary Lymphadenopathy] : no left axillary lymphadenopathy [Soft] : abdomen soft [Not Tender] : non-tender [No Rashes] : no rashes [No Edema] : no edema [No Ulceration] : no ulceration [de-identified] : in the periareolar area, she has a 3 x 3 cm area of fluctuant abscess with surrounding erythema, no significant induration; no other suspicious abnormalities palpated, s/p I and D of periareolar abscess today (2/24/2022) [de-identified] : in the inferior lateral portion of her breast, she has a 5 x 3 cm area of granulation tissue without any surrounding erythema or induration

## 2022-02-26 NOTE — PAST MEDICAL HISTORY
[Menstruating] : The patient is menstruating [Menarche Age ____] : age at menarche was [unfilled] [History of Hormone Replacement Treatment] : has no history of hormone replacement treatment [Total Preg ___] : G[unfilled] [FreeTextEntry6] : denies [FreeTextEntry5] : denies  [FreeTextEntry7] : denies [FreeTextEntry8] : n/a

## 2022-02-26 NOTE — REVIEW OF SYSTEMS
[Fever] : no fever [Chills] : no chills [As Noted in HPI] : as noted in HPI [Skin Lesions] : no skin lesions [Skin Wound] : skin wound [Breast Pain] : breast pain [Breast Lump] : breast lump [Negative] : Heme/Lymph

## 2022-02-26 NOTE — PROCEDURE
[___ ml Inj] : [unfilled] ~Uml [1%] : 1% [Betadine] : using betadine [FreeTextEntry1] : right periareolar abscess, s/p I and D  [FreeTextEntry2] : right periareolar abscess

## 2022-02-26 NOTE — DATA REVIEWED
[FreeTextEntry1] : \par Sep 23, 2021\par US BREAST COMPLETE BILATERAL\par Clinical Indication: 31-year-old female status post bilateral breast reduction with multiple areas of fluid collection. In addition patient underwent a bilateral breast implants..\par \par Compared to: 01/27/2021\par \par Ultrasound evaluation was performed including examination of all four quadrants of the breast(s) and the retroareolar region(s).\par \par No suspicious abnormalities were seen sonographically in either breast. In the right breast at 1:00 location 8 cm from the nipple there is a heterogeneous mass measuring 3.6 x 1.4 x 4.3 cm, increased in size compared to the prior study. This may represent an area of fat necrosis. Clinical follow-up is recommended. In the right breast at 7:00 location 10 cm from the nipple corresponding to the area of skin graft there is a tract to the skin without evidence of a fluid collection\par \par In the left breast at 7-6 o'clock location corresponding to the area of skin infection, no evidence of fluid collection is identified.\par IMPRESSION: \par Areas of skin infection with skin grafts without underlying fluid collections in both breast. Clinical follow-up is recommended.\par Mass in the right breast at 1:00 location 8 cm from the nipple increased in size compared to the prior study, likely represents an area of fat necrosis\par \par Surgical consultation of both breast(s) is recommended. A negative letter with history will be sent to the patient.\par BI-RADS 2: BENIGN \par  \par Dec 17, 2021 \par \par US BREAST COMPLETE BILATERAL\par Clinical indication: Patient is a 31-year-old female status post bilateral breast reduction and augmentation with implants. Patient was previously having multiple recurrent infections treated with intravenous antibiotics, skin grafting and drainage. There is been marked clinical improvement as per patient.\par \par Ultrasound evaluation was performed including examination of all four quadrants of the breast(s) and the retroareolar region(s).\par \par Again noted, is a heterogeneous well-circumscribed oval lesion deep within the right breast at 1:00 axis at 8 cm from nipple measuring 5.6 x 1.6 x 4.3 cm. It likely represents an area of fat necrosis. No surrounding edema or skin thickening is appreciated. \par \par In the left breast at 5:00 axis at 9 cm from the nipple, there is a similar appearing smaller well-circumscribed parallel lesion measuring 11 x 3 x 8 mm, likely also representing an area of fat necrosis. \par \par No suspicious abnormalities were seen sonographically in either breast\par \par IMPRESSION: \par Bilateral lesion as above, likely representing fat necrosis. Six-month follow-up targeted breast ultrasound is advised. Further management can be based on clinical grounds.\par \par Findings and recommendations were discussed with the patient at the time of exam.\par \par As further detailed above, a 6 month follow-up DIAGNOSTIC imaging exam of both breast(s) is recommended. A letter will be sent to the patient to return for follow up.\par \par BI-RADS 3: PROBABLY BENIGN \par \par EXAM: MG US BREAST LIMITED BI\par EXAM: MG MAMMO DIAG W JACQUELIN BI#\par \par \par PROCEDURE DATE: 02/23/2022\par \par \par \par INTERPRETATION: Clinical History / Reason for exam: Palpable abnormalities right breast.\par \par Additional History:The patient had an outside mammogram on 8/25/2020 prior to breast reduction in left.\par \par Following the procedure she presented to the outside facility again on 1/27/2021 with a complaint of pain and redness at the surgical sites. Subsequent mammogram was negative. Ultrasound demonstrated a fluid collection in the right breast 1:00 axis, 8 cm from the nipple, measuring up to 3 cm thought to likely represent fat necrosis and a similar appearing collection in the retroareolar region of the right breast measuring 4.5 cm. In length, 0.8 cm in AP diameter. In the left breast similar appearing fluid collections are identified at the 12:00 axis, 4 cm from the nipple measuring up to 2.1 cm and in the lower outer quadrant of the left breast measuring up to 5.6 cm. in length, also less than 1 cm in AP diameter. At that time clinical follow-up is recommended and it was stated that aspiration could be performed if clinically indicated.\par \par Subsequent breast ultrasound on 9/23/2021 demonstrated the collection at the 1:00 axis, 8 cm from the nipple to be enlarged, measuring up to 4.3 cm, and now appearing more solid. Still thought to represent fat necrosis. There is mention of fluid at the site of the skin graft however the patient did not give me a history of any graft being performed. At this time bilateral implants are noted.\par \par Next breast ultrasound was performed on 12/17/2021, also at an outside facility. History provided at that time was that the patient was status post reduction and augmentation with implants and was having multiple recurrent infections treated with IV antibiotics, skin grafting and drainage. The patient noted marked improvement. The mass at the right breast 1:00 axis, 8 cm from the nipple is once again enlarged, measuring up to 5.6 cm, still thought to be fat necrosis. A similar-appearing mass is noted at the left breast 5:00 axis, 9 cm from the nipple measuring up to 1.8 cm, thought to be fat necrosis.\par \par In January, 2022 the patient had the implants removed, reportedly on an emergent basis.\par \par Clinical Breast Exam: One month ago.\par \par Comparison: Outside examinations of 1/27/2021 and 8/25/2020\par \par Breast composition:There are scattered areas of fibroglandular density.\par \par Views: Multiple images were acquired using a digital system including routine and spot compression views of both breasts. The examination includes digital synthetic 2D and digital tomosynthesis 3D images. Additional imaging analysis was performed using CAD (computer-aided detection) software.\par \par Findings: There is architectural distortion in both breasts compatible with the patient's surgical history of reduction mammoplasty.\par \par There is a 2 cm focal asymmetry at the site of palpable complaint in the periareolar region of the right breast. Posterior to that is additional focal asymmetry measuring up to 1.8 cm with central fat compatible with fat necrosis. In the upper inner quadrant of the right breast there is a 5 cm oval mass with obscured margins most likely related to fat necrosis. In the inferior posterior left breast there is nodularity measuring up to 2 cm, likely related to fat necrosis.\par \par There is no suspicious mass, architectural distortion or grouped microcalcification is seen in either breast. There is nothing to suggest malignancy.\par \par Bilateral targeted breast ultrasound: On physical inspection the patient has an open wound on the lateral part of the left breast and a raised palpable lesion with a pore at the site of palpable complaint in the right breast.\par \par Right breast:\par \par At the site of palpable complaint which is the 2:00 periareolar region an intradermal mass is identified that has an extension into the underlying breast. Overall dimensions are 4.5 x 0.7 x 2.0 cm. The finding is most compatible compatible with an infected sebaceous cyst.\par \par Inferior to this in the 2:00 periareolar region there is a hypoechoic mass with internal fat that measures 1.6 x 1.3 x 0.6 cm, most compatible with fat necrosis. This correlates with the mammographic finding.\par \par The previously identified mass at the 1:00 axis, 8 cm from the nipple now measures 4.0 x 4.2 x 1.4 cm. At the posterior aspect there is a tail that extends for another 2 cm. This correlates with the mammographic finding and is most compatible with fat necrosis. This does not appear significantly changed from prior ultrasounds.\par \par In the left breast 6:00 axis, 4 cm from the nipple in the region of scar there is a hypoechoic mass that measures 0.7 x 0.5 x 0.4 cm. This correlates with the mammographic nodularity and is likely fat necrosis.\par \par Impression:\par 1. Palpable abnormality in the right breast is most likely an infected sebaceous cyst. Clinical management recommended\par 2. Bilateral focal asymmetries and breast ultrasound findings are likely related to fat necrosis. Six-month follow-up recommended.\par 3. Open wound left breast without underlying mammographic finding. Clinical management recommended.\par \par Recommendation: Follow-up bilateral diagnostic mammogram and ultrasound in 6 months. Clinical management of right sebaceous cyst and left breast wound.\par \par BI-RADS category 3: Probably Benign\par \par The findings and recommendation were discussed with the patient at the time of the exam.\par \par --- End of Report ---\par \par \par \par \par VANCE CROOKS MD; Attending Radiologist\par This document has been electronically signed. Feb 23 2022 6:20PM\par

## 2022-02-26 NOTE — HISTORY OF PRESENT ILLNESS
[FreeTextEntry1] : Kiah is 31 year old female here with bilateral breast chronic infections. \par \par She underwent a breast reduction about 2 years prior, but was not happy with the results so she had implants placed.  Since that surgery, she has had chronically draining wounds in bilateral breasts.  She had IV antibiotics administered for several months with no changes to her draining wounds. She was seen at [facility] (Dr. Hector) about 1 month prior, and was recommended to have both implants removed.  Following this surgery, she initially palpated a right periareolar mass that was hard and red.  She also has had an area of granulation tissue in the inferior lateral left breast @ her IMF, which she has been applying local wound care for many months with no changes to that area.  This prompted a repeat b/l dx mammogram and US on 2/23/2022 which revealed areas of fat necrosis as well as a superficial infected sebaceous cyst/abscess in the right periareolar area.  She was referred to me from the radiology center.  \par \par Currently, the area of her R perioareolar mass is now fluctuant, erythematous and causes her discomfort.  Her left inferior breast wound is still with granulation tissue. \par \par Her imaging is as follows:\par 9/23/21 \par RIGHT US:\par -@ 1N 8 heterogeneous mass measuring 3.6 x 1.4 x 4.3 cm, increased in size compared to the prior study. This may represent an area of fat necrosis. Clinical follow-up is recommended. \par -@7N 10  corresponding to the area of skin graft there is a tract to the skin without evidence of a fluid collection\par \par LEFT US:\par -@ 7-6 o'clock location corresponding to the area of skin infection, no evidence of fluid collection is identified.\par BI-RADS 2\par \par 12/17/21\par RIGHT US \par -heterogeneous well-circumscribed oval lesion deep within the right breast at 1:00 axis at 8 cm from nipple measuring 5.6 x 1.6 x 4.3 cm. It likely represents an area of fat necrosis. No surrounding edema or skin thickening is appreciated. \par LEFT US:\par -@5:00N 9 similar appearing smaller well-circumscribed parallel lesion measuring 11 x 3 x 8 mm, likely also representing an area of fat necrosis. \par BI-RADS 3\par \par 1/27/2022 -- b/l dx mammogram and US \par -scattered areas of fibroglandular densities\par -no signficant masses, calcifications or other findings in either breast, b/l post surgical changes \par b/l US \par RIGHT: \par -@1N8, echogenic fluid collection measuring 4.4 cm, may represent evolving fat necrosis \par -@RA, fluid collection measuring 4.5 cm \par LEFT: \par -@12N4, complicated fluid collection, 2.1 cm \par -LOQ, another fluid collection, spans 5.6 cm \par BIRADS 2 \par \par 2/23/2022 -- b/l dx mammogram and US \par RIGHT BREAST: \par -At the site of palpable complaint which is the 2:00 periareolar region an intradermal mass is identified that has an extension into the underlying breast. Overall dimensions are 4.5 x 0.7 x 2.0 cm. The finding is most compatible compatible with an infected sebaceous cyst.\par -Inferior to this in the 2:00 periareolar region there is a hypoechoic mass with internal fat that measures 1.6 x 1.3 x 0.6 cm, most compatible with fat necrosis. This correlates with the mammographic finding.\par \par -The previously identified mass at the 1:00 axis, 8 cm from the nipple now measures 4.0 x 4.2 x 1.4 cm. At the posterior aspect there is a tail that extends for another 2 cm. This correlates with the mammographic finding and is most compatible with fat necrosis. This does not appear significantly changed from prior ultrasounds.\par \par LEFT BREAST: \par -In the left breast 6:00 axis, 4 cm from the nipple in the region of scar there is a hypoechoic mass that measures 0.7 x 0.5 x 0.4 cm. This correlates with the mammographic nodularity and is likely fat necrosis.\par BIRADS 3 \par \par HISTORICAL RISK FACTORS:\par -status post bilateral breast reduction 2020, followed by b/l breast implants 2021(Surgeon in [place]) and removal in 2022 (Dr. Hector)\par -no fam hx of breast or ovarian cancer\par -G0\par -no OCP use\par -no gyn surgery

## 2022-02-26 NOTE — ASSESSMENT
[FreeTextEntry1] : Kiah is 31 year old female here with bilateral breast chronic infections. \par \par ON exam, in her right breast, she has in the periareolar area, a 3 x 3 cm area of fluctuant abscess with surrounding erythema, no significant induration; no other suspicious abnormalities palpated, s/p I and D of periareolar abscess today (2/24/2022).  In her left breast, in the inferior lateral portion of her breast, she has a 5 x 3 cm area of granulation tissue without any surrounding erythema or induration. \par \par In regards to local wound care: \par -daily dressing changes with 1/4 inch packing to right breast I and D site\par -Bactrim x 1 week \par -continue current wound care to left granulation tissue \par -f/up in 1 week for a wound check \par \par Per verbal discussion with Dr. Hector, I will continue to manage the right breast wound, however she has a follow up with Dr. Hector in two weeks, at which time the surgical plan was to schedule for excision of her left breast granulation tissue as this does not appear to improve with local wound care for several months.  \par \par After resolution of her acute symptoms, we will need to repeat her breast imaging to follow up on the areas of fat necrosis in b/l breasts. \par \par All of her questions were answered.  She knows to call with any further questions or concerns. \par \par PLAN: \par -daily dressing changes to b/l breasts as above \par -Bactrim x 1 week \par -f/up cultures \par -f/up in 1 week for a wound check \par

## 2022-02-26 NOTE — REASON FOR VISIT
[Initial Evaluation] : an initial evaluation [FreeTextEntry1] : right breast abscess, left breast wound

## 2022-03-01 LAB — BACTERIA WND CULT: NORMAL

## 2022-03-03 ENCOUNTER — APPOINTMENT (OUTPATIENT)
Dept: BREAST CENTER | Facility: CLINIC | Age: 32
End: 2022-03-03
Payer: MEDICAID

## 2022-03-03 PROCEDURE — 99024 POSTOP FOLLOW-UP VISIT: CPT

## 2022-03-04 NOTE — PAST MEDICAL HISTORY
[History of Hormone Replacement Treatment] : has no history of hormone replacement treatment [FreeTextEntry5] : denies  [FreeTextEntry6] : denies [FreeTextEntry7] : denies [FreeTextEntry8] : n/a

## 2022-03-04 NOTE — DATA REVIEWED
[FreeTextEntry1] : Patient:   ANN MARIE PÉREZ\par \par MRN:       [id number]\par \par :       1990\par \par FIN:       [id number]\par SEX:       Female\par \par Accession: [id number]\par \par Microbiology\par \par \par Test Name:                C ABS [P1]               Accession:                [id number]\par Source:                   .Abscess                 Body Site:\par Collected Date/Time:      2022 12:23 EST      Received Date/Time:       2022 00:31 EST\par Start Date/Time:          2022 00:31 EST      Free Text Source:\par \par ***FINAL REPORT***\par Final Report  []\par Reported Date/Time: 3/1/2022 16:03 EST\par No growth at 5 days\par \par

## 2022-03-04 NOTE — PHYSICAL EXAM
[Normocephalic] : normocephalic [Atraumatic] : atraumatic [EOMI] : extra ocular movement intact [No Supraclavicular Adenopathy] : no supraclavicular adenopathy [No Cervical Adenopathy] : no cervical adenopathy [No Axillary Lymphadenopathy] : no left axillary lymphadenopathy [Soft] : abdomen soft [Not Tender] : non-tender [No Edema] : no edema [No Rashes] : no rashes [No Ulceration] : no ulceration [de-identified] : in the periareolar area, she has a 1.5 x 1.5 cm area of granulation tissue with no underlying abscess and no surrounding erythema, no significant induration; no other suspicious abnormalities palpated, s/p I and D of periareolar abscess (2/24/2022) -- wet to dry dressing placed  [de-identified] : in the inferior lateral portion of her breast, she has a 5 x 3 cm area of granulation tissue without any surrounding erythema or induration -- unchanged

## 2022-03-04 NOTE — ASSESSMENT
[FreeTextEntry1] : Kiah is 31 year old female here with bilateral breast chronic infections. \par \par ON exam, in her right breast, in the periareolar area, she has a 1.5 x 1.5 cm area of granulation tissue with no underlying abscess and no surrounding erythema, no significant induration; no other suspicious abnormalities palpated, s/p I and D of periareolar abscess (2/24/2022) -- wet to dry dressing placed.  In her left breast, in the inferior lateral portion of her breast, she has a 5 x 3 cm area of granulation tissue without any surrounding erythema or induration. \par \par In regards to local wound care: \par -daily dressing changes with wet to dry dressings\par -Bactrim x 1 week, completed, no further antibiotics\par -continue current wound care to left granulation tissue \par -f/up in 2 weeks for a wound check \par \par Per verbal discussion with Dr. Hector, I will continue to manage the right breast wound, however she has a follow up with Dr. Hector in two weeks, at which time the surgical plan was to schedule for excision of her left breast granulation tissue as this does not appear to improve with local wound care for several months.  \par \par After resolution of her acute symptoms, we will need to repeat her breast imaging to follow up on the areas of fat necrosis in b/l breasts. \par \par All of her questions were answered.  She knows to call with any further questions or concerns. \par \par PLAN: \par -daily dressing changes to b/l breasts as above \par -Bactrim x 1 week, completed, no further antibiotics\par -f/up cultures \par -f/up in 2 weeks for a wound check \par

## 2022-03-04 NOTE — HISTORY OF PRESENT ILLNESS
[FreeTextEntry1] : Kiah is 31 year old female here with bilateral breast chronic infections. \par \par She underwent a breast reduction about 2 years prior, but was not happy with the results so she had implants placed.  Since that surgery, she has had chronically draining wounds in bilateral breasts.  She had IV antibiotics administered for several months with no changes to her draining wounds. She was seen at [facility] (Dr. Hector) about 1 month prior, and was recommended to have both implants removed.  Following this surgery, she initially palpated a right periareolar mass that was hard and red.  She also has had an area of granulation tissue in the inferior lateral left breast @ her IMF, which she has been applying local wound care for many months with no changes to that area.  This prompted a repeat b/l dx mammogram and US on 2/23/2022 which revealed areas of fat necrosis as well as a superficial infected sebaceous cyst/abscess in the right periareolar area.  She was referred to me from the radiology center.  \par \par Currently, the area of her R perioareolar mass is now fluctuant, erythematous and causes her discomfort.  Her left inferior breast wound is still with granulation tissue. \par \par Her imaging is as follows:\par 9/23/21 \par RIGHT US:\par -@ 1N 8 heterogeneous mass measuring 3.6 x 1.4 x 4.3 cm, increased in size compared to the prior study. This may represent an area of fat necrosis. Clinical follow-up is recommended. \par -@7N 10  corresponding to the area of skin graft there is a tract to the skin without evidence of a fluid collection\par \par LEFT US:\par -@ 7-6 o'clock location corresponding to the area of skin infection, no evidence of fluid collection is identified.\par BI-RADS 2\par \par 12/17/21\par RIGHT US \par -heterogeneous well-circumscribed oval lesion deep within the right breast at 1:00 axis at 8 cm from nipple measuring 5.6 x 1.6 x 4.3 cm. It likely represents an area of fat necrosis. No surrounding edema or skin thickening is appreciated. \par LEFT US:\par -@5:00N 9 similar appearing smaller well-circumscribed parallel lesion measuring 11 x 3 x 8 mm, likely also representing an area of fat necrosis. \par BI-RADS 3\par \par 1/27/2022 -- b/l dx mammogram and US \par -scattered areas of fibroglandular densities\par -no signficant masses, calcifications or other findings in either breast, b/l post surgical changes \par b/l US \par RIGHT: \par -@1N8, echogenic fluid collection measuring 4.4 cm, may represent evolving fat necrosis \par -@RA, fluid collection measuring 4.5 cm \par LEFT: \par -@12N4, complicated fluid collection, 2.1 cm \par -LOQ, another fluid collection, spans 5.6 cm \par BIRADS 2 \par \par 2/23/2022 -- b/l dx mammogram and US \par RIGHT BREAST: \par -At the site of palpable complaint which is the 2:00 periareolar region an intradermal mass is identified that has an extension into the underlying breast. Overall dimensions are 4.5 x 0.7 x 2.0 cm. The finding is most compatible compatible with an infected sebaceous cyst.\par -Inferior to this in the 2:00 periareolar region there is a hypoechoic mass with internal fat that measures 1.6 x 1.3 x 0.6 cm, most compatible with fat necrosis. This correlates with the mammographic finding.\par \par -The previously identified mass at the 1:00 axis, 8 cm from the nipple now measures 4.0 x 4.2 x 1.4 cm. At the posterior aspect there is a tail that extends for another 2 cm. This correlates with the mammographic finding and is most compatible with fat necrosis. This does not appear significantly changed from prior ultrasounds.\par \par LEFT BREAST: \par -In the left breast 6:00 axis, 4 cm from the nipple in the region of scar there is a hypoechoic mass that measures 0.7 x 0.5 x 0.4 cm. This correlates with the mammographic nodularity and is likely fat necrosis.\par BIRADS 3 \par \par HISTORICAL RISK FACTORS:\par -status post bilateral breast reduction 2020, followed by b/l breast implants 2021(Surgeon in [place]) and removal in 2022 (Dr. Hector)\par -no fam hx of breast or ovarian cancer\par -G0\par -no OCP use\par -no gyn surgery\par \par INTERVAL HISTORY 3/3/22\par Kiah is here for ONE WEEK follow up s/p I&D of right breast abscess \par \par 2/24/2022 -- R breast I and D \par -no growth at 5 days \par \par Her pain is improved in the right breast.  She has not palpated any new breast lesions, no new skin lesions, and is otherwise continuing to change her dressings daily.  She denies any fevers or chills and is continuing on the antibiotics.

## 2022-03-22 ENCOUNTER — APPOINTMENT (OUTPATIENT)
Dept: BREAST CENTER | Facility: CLINIC | Age: 32
End: 2022-03-22
Payer: MEDICAID

## 2022-03-22 VITALS
HEIGHT: 64 IN | TEMPERATURE: 96.2 F | WEIGHT: 204 LBS | DIASTOLIC BLOOD PRESSURE: 84 MMHG | BODY MASS INDEX: 34.83 KG/M2 | SYSTOLIC BLOOD PRESSURE: 134 MMHG

## 2022-03-22 PROCEDURE — 99212 OFFICE O/P EST SF 10 MIN: CPT

## 2022-03-22 NOTE — PAST MEDICAL HISTORY
[Menstruating] : The patient is menstruating [Menarche Age ____] : age at menarche was [unfilled] [Total Preg ___] : G[unfilled] [History of Hormone Replacement Treatment] : has no history of hormone replacement treatment [FreeTextEntry5] : denies  [FreeTextEntry6] : denies [FreeTextEntry7] : denies [FreeTextEntry8] : n/a

## 2022-03-22 NOTE — REVIEW OF SYSTEMS
[As Noted in HPI] : as noted in HPI [Skin Wound] : skin wound [Breast Pain] : breast pain [Breast Lump] : breast lump [Negative] : Heme/Lymph [Fever] : no fever [Chills] : no chills [Skin Lesions] : no skin lesions

## 2022-03-22 NOTE — PHYSICAL EXAM
[Normocephalic] : normocephalic [Atraumatic] : atraumatic [EOMI] : extra ocular movement intact [No Supraclavicular Adenopathy] : no supraclavicular adenopathy [No Cervical Adenopathy] : no cervical adenopathy [No Axillary Lymphadenopathy] : no left axillary lymphadenopathy [Not Tender] : non-tender [Soft] : abdomen soft [No Edema] : no edema [No Rashes] : no rashes [No Ulceration] : no ulceration [Examined in the supine and seated position] : examined in the supine and seated position [de-identified] : in the periareolar area, she has a 1.5 x 1.5 cm area of granulation tissue with no underlying abscess and no surrounding erythema, no significant induration; no other suspicious abnormalities palpated, s/p I and D of periareolar abscess (2/24/2022) -- wet to dry dressing placed  [de-identified] : in the inferior lateral portion of her breast, she has a 4 x 1 cm area of granulation tissue without any surrounding erythema or induration -- decreased in size

## 2022-03-22 NOTE — ASSESSMENT
[FreeTextEntry1] : Kiah is 31 year old female here with bilateral breast chronic infections. \par \par ON exam, in her right breast, in the periareolar area, she has a 1.5 x 1.5 cm area of granulation tissue with no underlying abscess and no surrounding erythema, no significant induration; no other suspicious abnormalities palpated, s/p I and D of periareolar abscess (2/24/2022).  In her left breast, in the inferior lateral portion of her breast, she has a 4 x 1 cm area of granulation tissue without any surrounding erythema or induration, decreased in size \par \par AS REVIEW:\par Per verbal discussion with Dr. Hector, I will continue to manage the right breast wound, however she has a follow up with Dr. Hector in two weeks, at which time the surgical plan was to schedule for excision of her left breast granulation tissue as this does not appear to improve with local wound care for several months.  \par \par After resolution of her acute symptoms, we will need to repeat her breast imaging to follow up on the areas of fat necrosis in b/l breasts. \par \par All of her questions were answered.  She knows to call with any further questions or concerns. \par \par PLAN: \par -b/l dx mammo and US on 8/23/22\par -follow up after \par

## 2022-03-22 NOTE — HISTORY OF PRESENT ILLNESS
[FreeTextEntry1] : Kiah is 31 year old female here with bilateral breast chronic infections. \par \par She underwent a breast reduction about 2 years prior, but was not happy with the results so she had implants placed.  Since that surgery, she has had chronically draining wounds in bilateral breasts.  She had IV antibiotics administered for several months with no changes to her draining wounds. She was seen at [facility] (Dr. Hector) about 1 month prior, and was recommended to have both implants removed.  Following this surgery, she initially palpated a right periareolar mass that was hard and red.  She also has had an area of granulation tissue in the inferior lateral left breast @ her IMF, which she has been applying local wound care for many months with no changes to that area.  This prompted a repeat b/l dx mammogram and US on 2/23/2022 which revealed areas of fat necrosis as well as a superficial infected sebaceous cyst/abscess in the right periareolar area.  She was referred to me from the radiology center.  \par \par Currently, the area of her R perioareolar mass is now fluctuant, erythematous and causes her discomfort.  Her left inferior breast wound is still with granulation tissue. \par \par Her imaging is as follows:\par 9/23/21 \par RIGHT US:\par -@ 1N 8 heterogeneous mass measuring 3.6 x 1.4 x 4.3 cm, increased in size compared to the prior study. This may represent an area of fat necrosis. Clinical follow-up is recommended. \par -@7N 10  corresponding to the area of skin graft there is a tract to the skin without evidence of a fluid collection\par \par LEFT US:\par -@ 7-6 o'clock location corresponding to the area of skin infection, no evidence of fluid collection is identified.\par BI-RADS 2\par \par 12/17/21\par RIGHT US \par -heterogeneous well-circumscribed oval lesion deep within the right breast at 1:00 axis at 8 cm from nipple measuring 5.6 x 1.6 x 4.3 cm. It likely represents an area of fat necrosis. No surrounding edema or skin thickening is appreciated. \par LEFT US:\par -@5:00N 9 similar appearing smaller well-circumscribed parallel lesion measuring 11 x 3 x 8 mm, likely also representing an area of fat necrosis. \par BI-RADS 3\par \par 1/27/2022 -- b/l dx mammogram and US \par -scattered areas of fibroglandular densities\par -no signficant masses, calcifications or other findings in either breast, b/l post surgical changes \par b/l US \par RIGHT: \par -@1N8, echogenic fluid collection measuring 4.4 cm, may represent evolving fat necrosis \par -@RA, fluid collection measuring 4.5 cm \par LEFT: \par -@12N4, complicated fluid collection, 2.1 cm \par -LOQ, another fluid collection, spans 5.6 cm \par BIRADS 2 \par \par 2/23/2022 -- b/l dx mammogram and US \par RIGHT BREAST: \par -At the site of palpable complaint which is the 2:00 periareolar region an intradermal mass is identified that has an extension into the underlying breast. Overall dimensions are 4.5 x 0.7 x 2.0 cm. The finding is most compatible compatible with an infected sebaceous cyst.\par -Inferior to this in the 2:00 periareolar region there is a hypoechoic mass with internal fat that measures 1.6 x 1.3 x 0.6 cm, most compatible with fat necrosis. This correlates with the mammographic finding.\par \par -The previously identified mass at the 1:00 axis, 8 cm from the nipple now measures 4.0 x 4.2 x 1.4 cm. At the posterior aspect there is a tail that extends for another 2 cm. This correlates with the mammographic finding and is most compatible with fat necrosis. This does not appear significantly changed from prior ultrasounds.\par \par LEFT BREAST: \par -In the left breast 6:00 axis, 4 cm from the nipple in the region of scar there is a hypoechoic mass that measures 0.7 x 0.5 x 0.4 cm. This correlates with the mammographic nodularity and is likely fat necrosis.\par BIRADS 3 \par \par HISTORICAL RISK FACTORS:\par -status post bilateral breast reduction 2020, followed by b/l breast implants 2021(Surgeon in [place]) and removal in 2022 (Dr. Hector)\par -no fam hx of breast or ovarian cancer\par -G0\par -no OCP use\par -no gyn surgery\par \par INTERVAL HISTORY 3/3/22\par Kiah is here for ONE WEEK follow up s/p I&D of right breast abscess \par \par 2/24/2022 -- R breast I and D \par -no growth at 5 days \par \par Her pain is improved in the right breast.  She has not palpated any new breast lesions, no new skin lesions, and is otherwise continuing to change her dressings daily.  She denies any fevers or chills and is continuing on the antibiotics.  \par \par INTERVAL HISTORY: \par 3/22/2022\par Kiah returns for a 2 week follow up s/p R breast I and D on 2/24/2022. She states she is feeling well and denies any redness, swelling or tenderness in her right breast

## 2022-05-26 ENCOUNTER — APPOINTMENT (OUTPATIENT)
Dept: OBGYN | Facility: CLINIC | Age: 32
End: 2022-05-26
Payer: COMMERCIAL

## 2022-05-26 VITALS
DIASTOLIC BLOOD PRESSURE: 82 MMHG | SYSTOLIC BLOOD PRESSURE: 122 MMHG | WEIGHT: 186 LBS | HEIGHT: 64 IN | BODY MASS INDEX: 31.76 KG/M2

## 2022-05-26 DIAGNOSIS — Z98.890 OTHER SPECIFIED POSTPROCEDURAL STATES: ICD-10-CM

## 2022-05-26 DIAGNOSIS — Z98.84 BARIATRIC SURGERY STATUS: ICD-10-CM

## 2022-05-26 DIAGNOSIS — Z82.49 FAMILY HISTORY OF ISCHEMIC HEART DISEASE AND OTHER DISEASES OF THE CIRCULATORY SYSTEM: ICD-10-CM

## 2022-05-26 DIAGNOSIS — Z86.2 PERSONAL HISTORY OF DISEASES OF THE BLOOD AND BLOOD-FORMING ORGANS AND CERTAIN DISORDERS INVOLVING THE IMMUNE MECHANISM: ICD-10-CM

## 2022-05-26 PROCEDURE — 99385 PREV VISIT NEW AGE 18-39: CPT

## 2022-05-26 NOTE — HISTORY OF PRESENT ILLNESS
[FreeTextEntry1] : Patient is 31 years old para 0-0-2-0 last menstrual period May 5, 2022.\par She has a Mirena intrauterine device x2 years and notes light menstrual periods every month lasting approximately 1 day.\par Presently she complains of vaginal discharge with odor and vulvar irritation and inflammation.\par Patient states that she has a history of recurrent herpes genitalia and uses Valtrex suppression therapy with good results..

## 2022-05-26 NOTE — DISCUSSION/SUMMARY
[FreeTextEntry1] : Pap, gonorrhea and Chlamydia test done Pap, gonorrhea and Chlamydia test done\par B VV test done\par Prescribed Valtrex 500 mg daily as directed\par Prescribed Diflucan and Lotrisone cream, prescribed MetroGel vaginal as directed\par Keep menstrual calendar\par Follow-up yearly or as needed

## 2022-09-09 ENCOUNTER — RESULT REVIEW (OUTPATIENT)
Age: 32
End: 2022-09-09

## 2022-09-09 ENCOUNTER — OUTPATIENT (OUTPATIENT)
Dept: OUTPATIENT SERVICES | Facility: HOSPITAL | Age: 32
LOS: 1 days | Discharge: HOME | End: 2022-09-09

## 2022-09-09 DIAGNOSIS — Z98.84 BARIATRIC SURGERY STATUS: Chronic | ICD-10-CM

## 2022-09-09 DIAGNOSIS — N63.10 UNSPECIFIED LUMP IN THE RIGHT BREAST, UNSPECIFIED QUADRANT: ICD-10-CM

## 2022-09-09 PROCEDURE — 76642 ULTRASOUND BREAST LIMITED: CPT | Mod: 26,50

## 2022-09-09 PROCEDURE — 77066 DX MAMMO INCL CAD BI: CPT | Mod: 26

## 2022-09-09 PROCEDURE — 77062 BREAST TOMOSYNTHESIS BI: CPT | Mod: 26

## 2022-09-13 ENCOUNTER — APPOINTMENT (OUTPATIENT)
Dept: OBGYN | Facility: CLINIC | Age: 32
End: 2022-09-13

## 2022-09-13 VITALS — SYSTOLIC BLOOD PRESSURE: 124 MMHG | HEIGHT: 64 IN | DIASTOLIC BLOOD PRESSURE: 74 MMHG

## 2022-09-13 PROCEDURE — 58301 REMOVE INTRAUTERINE DEVICE: CPT

## 2022-09-13 RX ORDER — DOXYCYCLINE HYCLATE 100 MG/1
100 TABLET ORAL
Qty: 14 | Refills: 0 | Status: COMPLETED | COMMUNITY
Start: 2022-09-13 | End: 2022-09-20

## 2022-09-16 ENCOUNTER — RESULT REVIEW (OUTPATIENT)
Age: 32
End: 2022-09-16

## 2022-09-16 ENCOUNTER — OUTPATIENT (OUTPATIENT)
Dept: OUTPATIENT SERVICES | Facility: HOSPITAL | Age: 32
LOS: 1 days | Discharge: HOME | End: 2022-09-16

## 2022-09-16 DIAGNOSIS — Z98.84 BARIATRIC SURGERY STATUS: Chronic | ICD-10-CM

## 2022-09-16 PROCEDURE — 19083 BX BREAST 1ST LESION US IMAG: CPT | Mod: RT

## 2022-09-16 PROCEDURE — 77065 DX MAMMO INCL CAD UNI: CPT | Mod: 26,RT

## 2022-09-16 PROCEDURE — 88305 TISSUE EXAM BY PATHOLOGIST: CPT | Mod: 26

## 2022-09-19 LAB — SURGICAL PATHOLOGY STUDY: SIGNIFICANT CHANGE UP

## 2022-09-21 ENCOUNTER — NON-APPOINTMENT (OUTPATIENT)
Age: 32
End: 2022-09-21

## 2022-09-21 DIAGNOSIS — N63.10 UNSPECIFIED LUMP IN THE RIGHT BREAST, UNSPECIFIED QUADRANT: ICD-10-CM

## 2022-09-27 ENCOUNTER — NON-APPOINTMENT (OUTPATIENT)
Age: 32
End: 2022-09-27

## 2022-11-10 ENCOUNTER — APPOINTMENT (OUTPATIENT)
Dept: NEUROSURGERY | Facility: CLINIC | Age: 32
End: 2022-11-10

## 2022-11-10 VITALS — HEIGHT: 64 IN | BODY MASS INDEX: 32.44 KG/M2 | WEIGHT: 190 LBS

## 2022-11-10 PROCEDURE — 99204 OFFICE O/P NEW MOD 45 MIN: CPT

## 2022-11-11 ENCOUNTER — OUTPATIENT (OUTPATIENT)
Dept: OUTPATIENT SERVICES | Facility: HOSPITAL | Age: 32
LOS: 1 days | Discharge: HOME | End: 2022-11-11

## 2022-11-11 DIAGNOSIS — Z98.84 BARIATRIC SURGERY STATUS: Chronic | ICD-10-CM

## 2022-11-11 DIAGNOSIS — M54.2 CERVICALGIA: ICD-10-CM

## 2022-11-11 DIAGNOSIS — M54.50 LOW BACK PAIN, UNSPECIFIED: ICD-10-CM

## 2022-11-11 DIAGNOSIS — M41.9 SCOLIOSIS, UNSPECIFIED: ICD-10-CM

## 2022-11-11 PROCEDURE — 72082 X-RAY EXAM ENTIRE SPI 2/3 VW: CPT | Mod: 26

## 2022-11-11 NOTE — ED PROVIDER NOTE - NS ED ATTENDING STATEMENT MOD
[FreeTextEntry1] : Recommend supportive care including antipyretics, fluids, and nasal saline followed by nasal suction. Return if symptoms worsen or persist.\par RVP sent - will call in 2 days for results, isolate until results return\par Discussed signs/symptoms that would require immediate care.  Mother expressed understanding.\par 
I have personally performed a face to face diagnostic evaluation on this patient. I have reviewed the ACP note and agree with the history, exam and plan of care, except as noted.

## 2022-11-12 ENCOUNTER — OUTPATIENT (OUTPATIENT)
Dept: OUTPATIENT SERVICES | Facility: HOSPITAL | Age: 32
LOS: 1 days | Discharge: HOME | End: 2022-11-12

## 2022-11-12 DIAGNOSIS — M54.2 CERVICALGIA: ICD-10-CM

## 2022-11-12 DIAGNOSIS — Z98.84 BARIATRIC SURGERY STATUS: Chronic | ICD-10-CM

## 2022-11-12 PROCEDURE — 72148 MRI LUMBAR SPINE W/O DYE: CPT | Mod: 26

## 2022-11-12 PROCEDURE — 72141 MRI NECK SPINE W/O DYE: CPT | Mod: 26

## 2022-11-14 NOTE — ASSESSMENT
[FreeTextEntry1] : 31 yo F who presents with longsstanding history of cervical and lumbar pain complaints for which she has attempted/failed conservative efforts. She has been advised to proceed with MR C Spine and MR L Spine w/o contrast. X-ray thoracolumbar scoliosis warranted as well. All questions/ concerns have been answered and she is agreeable to proceed\par \par I will have her return in 4 weeks to review testing and to devise a continued treatment plan.\par \par Elza Saunders PA-C\par Francesca Lopez MD

## 2022-11-28 ENCOUNTER — APPOINTMENT (OUTPATIENT)
Dept: NEUROSURGERY | Facility: CLINIC | Age: 32
End: 2022-11-28

## 2022-11-28 PROCEDURE — 99441: CPT

## 2022-11-28 NOTE — HISTORY OF PRESENT ILLNESS
[FreeTextEntry1] : cervical and lumbar MRI reviewed with patient\par mild cervical disc disease\par L3-4, L4-5 DDD and bulging discs in lumbar spine\par scoliosis\par \par Recommend initiating PT\par follow-up, consider NIRALI if PT ineffective

## 2022-12-16 ENCOUNTER — NON-APPOINTMENT (OUTPATIENT)
Age: 32
End: 2022-12-16

## 2023-01-10 ENCOUNTER — APPOINTMENT (OUTPATIENT)
Dept: BREAST CENTER | Facility: CLINIC | Age: 33
End: 2023-01-10
Payer: COMMERCIAL

## 2023-01-10 VITALS
BODY MASS INDEX: 34.15 KG/M2 | SYSTOLIC BLOOD PRESSURE: 122 MMHG | DIASTOLIC BLOOD PRESSURE: 70 MMHG | HEIGHT: 64 IN | WEIGHT: 200 LBS

## 2023-01-10 PROCEDURE — 99214 OFFICE O/P EST MOD 30 MIN: CPT

## 2023-01-10 NOTE — ASSESSMENT
[FreeTextEntry1] : Patient is a 32F with bilateral chronic breast infections and a right infected sebaceous cyst that was drained in 2/2022.  She underwent breast reduction in 2020 but was unhappy with the results and had implants in 2021, from which she had chronic draining wounds. She subsequently had the implants removed in 2022.  She had an infected sebaceous cyst drained in 2/2022. She was last seen in clinic in 3/2022 with plan for imaging in 8/2022.  She had a mm/us in 9/2022 which showed improvement or resolution of previous areas but a new right 7N5 1.8cm that was biopsied to be acute and chronic area of inflammation consistent with dilated and ruptured duct undergoing resolution (ninat, concordant).  We discussed that her pathology was benign.  She states that she has no current complaints and that her breast infections seem to be improving.  We discussed obtaining imaging 6 months since her prior imaging for short term follow up/ to monitor for resolution or improvement. Patient agrees with the plan.  We also discussed breast density. Increasing breast density has been found to increase ones risk of breast cancer, but at this time, there is no clear indication for additional imaging in this setting, as both US and MRI have not been found to improve survival. One can consider bilateral screening US. However, out of 1000 women screened, the use of routine US will only identify an additional 3-5 cancers. The use of US was found to increase the likelihood of undergoing more imaging and more biopsies. She does have dense breasts. Therefore, she should continue with ultrasounds for screening.  All questions and concerns were answered in detail.  Patient is for bilateral mmg/us in 3/2023.  She is to follow up after imaging, pending any interval changes.  Total time spent on encounter was greater than 30 minutes, which included face to face time with the patient, performing an exam, reviewing previous medical records including imaging/ pathology, documenting in patient record and coordinating care/imaging. Greater than 50% of the encounter was spent on counseling and coordination of her breast issue.

## 2023-01-10 NOTE — PHYSICAL EXAM
[Normocephalic] : normocephalic [EOMI] : extra ocular movement intact [No Supraclavicular Adenopathy] : no supraclavicular adenopathy [No Cervical Adenopathy] : no cervical adenopathy [Examined in the supine and seated position] : examined in the supine and seated position [No dominant masses] : no dominant masses in right breast  [No dominant masses] : no dominant masses left breast [No Nipple Retraction] : no left nipple retraction [No Nipple Discharge] : no left nipple discharge [Breast Mass Right Breast ___cm] : no masses [Breast Mass Left Breast ___cm] : no masses [No Axillary Lymphadenopathy] : no left axillary lymphadenopathy [Soft] : abdomen soft [No Rashes] : no rashes [No Ulceration] : no ulceration [Breast Nipple Inversion] : nipples not inverted [Breast Nipple Retraction] : nipples not retracted [Breast Nipple Flattening] : nipples not flattened [Breast Nipple Fissures] : nipples not fissured [de-identified] : Nl respirations [de-identified] : Healed bilateral wounds [de-identified] : Healed bilateral wounds

## 2023-01-10 NOTE — HISTORY OF PRESENT ILLNESS
[FreeTextEntry1] : Patient is a 32F with chronic bilateral breast infections and areas of fat necrosis.\par \par \par HISTORICAL RISK FACTORS:\par -status post bilateral breast reduction 2020, followed by b/l breast implants 2021(Surgeon in [place]) and removal in 2022 (Dr. Hector)\par -no fam hx of breast or ovarian cancer\par -G0\par -no OCP use\par -no gyn surgery\par \par She underwent a breast reduction in 2020, but was not happy with the results so she had implants placed in 2021.  Since that surgery, she has had chronically draining wounds in bilateral breasts.  She had IV antibiotics administered for several months with no changes to her draining wounds. She was seen at [facility] (Dr. Hector)  and was recommended to have both implants removed.  Following this surgery in 2022, she initially palpated a right periareolar mass that was hard and red.  She also has had an area of granulation tissue in the inferior lateral left breast @ her IMF, which she has been applying local wound care for many months with no changes to that area.  This prompted a repeat b/l dx mammogram and US on 2/23/2022 which revealed areas of fat necrosis as well as a superficial infected sebaceous cyst/abscess in the right periareolar area that was drained in 2/2022. She then had an area biopsied in her right breast in 9/2022 that showed acute and chronic inflammation consistent with dilated and ruptured cysts undergoing resolution.\par \par Her imaging is as follows:\par 9/23/21 \par RIGHT US:\par -@ 1N 8 heterogeneous mass measuring 3.6 x 1.4 x 4.3 cm, increased in size compared to the prior study. This may represent an area of fat necrosis. Clinical follow-up is recommended. \par -@7N 10  corresponding to the area of skin graft there is a tract to the skin without evidence of a fluid collection\par LEFT US:\par -@ 7-6 o'clock location corresponding to the area of skin infection, no evidence of fluid collection is identified.\par BI-RADS 2\par \par 12/17/21 --> BIRADS 3\par RIGHT US \par -heterogeneous well-circumscribed oval lesion deep within the right breast at 1:00 axis at 8 cm from nipple measuring 5.6 x 1.6 x 4.3 cm. It likely represents an area of fat necrosis. No surrounding edema or skin thickening is appreciated. \par LEFT US:\par -@5:00N 9 similar appearing smaller well-circumscribed parallel lesion measuring 11 x 3 x 8 mm, likely also representing an area of fat necrosis. \par \par 1/27/2022 -- b/l dx mammogram and US  --> BIRADS 2\par -scattered areas of fibroglandular densities\par -no signficant masses, calcifications or other findings in either breast, b/l post surgical changes \par b/l US \par RIGHT: \par -@1N8, echogenic fluid collection measuring 4.4 cm, may represent evolving fat necrosis \par -@RA, fluid collection measuring 4.5 cm \par LEFT: \par -@12N4, complicated fluid collection, 2.1 cm \par -LOQ, another fluid collection, spans 5.6 cm \par \par \par 2/23/2022 -- b/l dx mammogram and US --> BIRADS 3\par RIGHT BREAST: \par -At the site of palpable complaint which is the 2:00 periareolar region an intradermal mass is identified that has an extension into the underlying breast. Overall dimensions are 4.5 x 0.7 x 2.0 cm. The finding is most compatible compatible with an infected sebaceous cyst.\par -Inferior to this in the 2:00 periareolar region there is a hypoechoic mass with internal fat that measures 1.6 x 1.3 x 0.6 cm, most compatible with fat necrosis. This correlates with the mammographic finding.\par -The previously identified mass at the 1:00 axis, 8 cm from the nipple now measures 4.0 x 4.2 x 1.4 cm. At the posterior aspect there is a tail that extends for another 2 cm. This correlates with the mammographic finding and is most compatible with fat necrosis. This does not appear significantly changed from prior ultrasounds.\par LEFT BREAST: \par -In the left breast 6:00 axis, 4 cm from the nipple in the region of scar there is a hypoechoic mass that measures 0.7 x 0.5 x 0.4 cm. This correlates with the mammographic nodularity and is likely fat necrosis.\par  \par \par 2/24/2022 -- R breast I and D \par -no growth at 5 days \par \par She was last seen in clinic on 3/2022 with recommendation for bilateral breast imaging in 8/2022.\par \par 9/9/22: B mmg/us --> BIRADS 4\par Dense\par In the region of palpable concern in the right breast, lower outer quadrant, there is an ovoid isodense mass. This was not definitely seen on prior exams. Bilateral asymmetries are otherwise stable or less prominent since prior exams. No suspicious calcifications or architectural distortions are identified.\par \par In the right breast at 7:00 5 cm from the nipple, there is a new hypoechoic mass measuring 18 x 9 x 7 mm. Although this most likely reflects an area of fat necrosis, nevertheless, ultrasound guided biopsy is recommended as this lesion is palpable.\par Previously noted lesions in the right breast at 2:00 periareolar regions are no longer seen on the current exam\par Previously noted complex cyst with a tail in the right breast at 1:00 8 cm from the nipple is smaller since the prior exam and is compatible with benign etiology such as fat necrosis/seroma. Currently it measures up to 3 cm, previously it measured up to 6 cm.\par Previously noted lesion in the left breast at 6:00 4 cm from the nipple is no longer seen. Only residual scar is identified.\par \par 9/16/22: USGB , R\par Benign breast tissue with nodular mixed acute and chronic inflammation associated with both reactive fibrosis and microabscess formation; most consistent with a dilated and ruptured duct undergoing resolution. The nodular inflammation consists of clusters of neutrophils, lymphocytes, plasma cells, foamy histiocytes, and multinucleated foreign body type giant cells including non-necrotizing granuloma formation.\par (tophat, concordant)\par \par Patient denies any current complaints. Denies any new symptoms. States she is doing well.
no

## 2023-03-08 NOTE — PAST MEDICAL HISTORY
[Menstruating] : The patient is menstruating [Menarche Age ____] : age at menarche was [unfilled] [History of Hormone Replacement Treatment] : has no history of hormone replacement treatment [Total Preg ___] : G[unfilled] [FreeTextEntry5] : denies  [FreeTextEntry6] : denies [FreeTextEntry7] : denies [FreeTextEntry8] : n/a

## 2023-03-08 NOTE — HISTORY OF PRESENT ILLNESS
[FreeTextEntry1] : Patient is a 32F with chronic bilateral breast infections and areas of fat necrosis.\par \par \par HISTORICAL RISK FACTORS:\par -status post bilateral breast reduction 2020, followed by b/l breast implants 2021(Surgeon in [place]) and removal in 2022 (Dr. Hector)\par -no fam hx of breast or ovarian cancer\par -G0\par -no OCP use\par -no gyn surgery\par \par She underwent a breast reduction in 2020, but was not happy with the results so she had implants placed in 2021.  Since that surgery, she has had chronically draining wounds in bilateral breasts.  She had IV antibiotics administered for several months with no changes to her draining wounds. She was seen at [facility] (Dr. Hector)  and was recommended to have both implants removed.  Following this surgery in 2022, she initially palpated a right periareolar mass that was hard and red.  She also has had an area of granulation tissue in the inferior lateral left breast @ her IMF, which she has been applying local wound care for many months with no changes to that area.  This prompted a repeat b/l dx mammogram and US on 2/23/2022 which revealed areas of fat necrosis as well as a superficial infected sebaceous cyst/abscess in the right periareolar area that was drained in 2/2022. She then had an area biopsied in her right breast in 9/2022 that showed acute and chronic inflammation consistent with dilated and ruptured cysts undergoing resolution.\par \par Her imaging is as follows:\par 9/23/21 \par RIGHT US:\par -@ 1N 8 heterogeneous mass measuring 3.6 x 1.4 x 4.3 cm, increased in size compared to the prior study. This may represent an area of fat necrosis. Clinical follow-up is recommended. \par -@7N 10  corresponding to the area of skin graft there is a tract to the skin without evidence of a fluid collection\par LEFT US:\par -@ 7-6 o'clock location corresponding to the area of skin infection, no evidence of fluid collection is identified.\par BI-RADS 2\par \par 12/17/21 --> BIRADS 3\par RIGHT US \par -heterogeneous well-circumscribed oval lesion deep within the right breast at 1:00 axis at 8 cm from nipple measuring 5.6 x 1.6 x 4.3 cm. It likely represents an area of fat necrosis. No surrounding edema or skin thickening is appreciated. \par LEFT US:\par -@5:00N 9 similar appearing smaller well-circumscribed parallel lesion measuring 11 x 3 x 8 mm, likely also representing an area of fat necrosis. \par \par 1/27/2022 -- b/l dx mammogram and US  --> BIRADS 2\par -scattered areas of fibroglandular densities\par -no signficant masses, calcifications or other findings in either breast, b/l post surgical changes \par b/l US \par RIGHT: \par -@1N8, echogenic fluid collection measuring 4.4 cm, may represent evolving fat necrosis \par -@RA, fluid collection measuring 4.5 cm \par LEFT: \par -@12N4, complicated fluid collection, 2.1 cm \par -LOQ, another fluid collection, spans 5.6 cm \par \par \par 2/23/2022 -- b/l dx mammogram and US --> BIRADS 3\par RIGHT BREAST: \par -At the site of palpable complaint which is the 2:00 periareolar region an intradermal mass is identified that has an extension into the underlying breast. Overall dimensions are 4.5 x 0.7 x 2.0 cm. The finding is most compatible compatible with an infected sebaceous cyst.\par -Inferior to this in the 2:00 periareolar region there is a hypoechoic mass with internal fat that measures 1.6 x 1.3 x 0.6 cm, most compatible with fat necrosis. This correlates with the mammographic finding.\par -The previously identified mass at the 1:00 axis, 8 cm from the nipple now measures 4.0 x 4.2 x 1.4 cm. At the posterior aspect there is a tail that extends for another 2 cm. This correlates with the mammographic finding and is most compatible with fat necrosis. This does not appear significantly changed from prior ultrasounds.\par LEFT BREAST: \par -In the left breast 6:00 axis, 4 cm from the nipple in the region of scar there is a hypoechoic mass that measures 0.7 x 0.5 x 0.4 cm. This correlates with the mammographic nodularity and is likely fat necrosis.\par  \par \par 2/24/2022 -- R breast I and D \par -no growth at 5 days \par \par She was last seen in clinic on 3/2022 with recommendation for bilateral breast imaging in 8/2022.\par \par 9/9/22: B mmg/us --> BIRADS 4\par Dense\par In the region of palpable concern in the right breast, lower outer quadrant, there is an ovoid isodense mass. This was not definitely seen on prior exams. Bilateral asymmetries are otherwise stable or less prominent since prior exams. No suspicious calcifications or architectural distortions are identified.\par \par In the right breast at 7:00 5 cm from the nipple, there is a new hypoechoic mass measuring 18 x 9 x 7 mm. Although this most likely reflects an area of fat necrosis, nevertheless, ultrasound guided biopsy is recommended as this lesion is palpable.\par Previously noted lesions in the right breast at 2:00 periareolar regions are no longer seen on the current exam\par Previously noted complex cyst with a tail in the right breast at 1:00 8 cm from the nipple is smaller since the prior exam and is compatible with benign etiology such as fat necrosis/seroma. Currently it measures up to 3 cm, previously it measured up to 6 cm.\par Previously noted lesion in the left breast at 6:00 4 cm from the nipple is no longer seen. Only residual scar is identified.\par \par 9/16/22: USGB , R\par Benign breast tissue with nodular mixed acute and chronic inflammation associated with both reactive fibrosis and microabscess formation; most consistent with a dilated and ruptured duct undergoing resolution. The nodular inflammation consists of clusters of neutrophils, lymphocytes, plasma cells, foamy histiocytes, and multinucleated foreign body type giant cells including non-necrotizing granuloma formation.\par (tophat, concordant)\par \par She was seen in clinic in 1/2023 with no noew complaints and recommended imaging in 3/2023.

## 2023-03-08 NOTE — REASON FOR VISIT
[Follow-Up: _____] : a [unfilled] follow-up visit [FreeTextEntry1] : History of bilateral chronic breast infections

## 2023-03-14 ENCOUNTER — APPOINTMENT (OUTPATIENT)
Dept: BREAST CENTER | Facility: CLINIC | Age: 33
End: 2023-03-14

## 2023-04-21 ENCOUNTER — APPOINTMENT (OUTPATIENT)
Dept: SURGERY | Facility: CLINIC | Age: 33
End: 2023-04-21

## 2023-04-21 ENCOUNTER — OUTPATIENT (OUTPATIENT)
Dept: OUTPATIENT SERVICES | Facility: HOSPITAL | Age: 33
LOS: 1 days | End: 2023-04-21
Payer: COMMERCIAL

## 2023-04-21 ENCOUNTER — APPOINTMENT (OUTPATIENT)
Dept: PODIATRY | Facility: CLINIC | Age: 33
End: 2023-04-21
Payer: COMMERCIAL

## 2023-04-21 VITALS
SYSTOLIC BLOOD PRESSURE: 111 MMHG | HEIGHT: 64 IN | WEIGHT: 190 LBS | BODY MASS INDEX: 32.44 KG/M2 | HEART RATE: 89 BPM | DIASTOLIC BLOOD PRESSURE: 75 MMHG
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
## 2023-04-21 DIAGNOSIS — Z00.00 ENCOUNTER FOR GENERAL ADULT MEDICAL EXAMINATION WITHOUT ABNORMAL FINDINGS: ICD-10-CM

## 2023-04-21 PROCEDURE — 99204 OFFICE O/P NEW MOD 45 MIN: CPT

## 2023-04-21 NOTE — REASON FOR VISIT
[Initial Visit] : an initial visit for [FreeTextEntry2] : Stepped on glass on Monday and thinks there is foreign body

## 2023-04-21 NOTE — ASSESSMENT
[FreeTextEntry1] : - Pt seen and evaluated\par - Dbx callus on the L plantar heel no pinpoint bleeding does not appear to be a verrucous lesion nature., no foreign body identified, no entry point noted\par Assessment: Heel fissures\par \par - Rx;Urea cream 40% for treatment heel fissure\par - RTC PRN

## 2023-04-21 NOTE — END OF VISIT
[] : Resident [Resident] : Resident [FreeTextEntry3] : agree with above note.  \par Was present and instructing resident during visit. \par All Procedure performed under direct supervision.  \par GAYATHRI Steele DPM\par

## 2023-04-21 NOTE — PHYSICAL EXAM
[Ankle Swelling Bilaterally] : bilaterally  [Sensation] : the sensory exam was normal to light touch and pinprick [Oriented To Time, Place, And Person] : oriented to person, place, and time [Ankle Swelling (On Exam)] : not present [Varicose Veins Of Lower Extremities] : not present [] : not present [Delayed in the Right Toes] : capillary refills normal in right toes [Delayed in the Left Toes] : capillary refills normal in the left toes [de-identified] : Mild pain on palpation L heel, fissure plantar L kamran [FreeTextEntry1] : Fissure plantar L heel \par \par Debrided down no pinpoint bleeding noted\par \par

## 2023-04-27 DIAGNOSIS — M79.671 PAIN IN RIGHT FOOT: ICD-10-CM

## 2023-04-27 DIAGNOSIS — R60.9 EDEMA, UNSPECIFIED: ICD-10-CM

## 2023-04-27 DIAGNOSIS — S90.859A SUPERFICIAL FOREIGN BODY, UNSPECIFIED FOOT, INITIAL ENCOUNTER: ICD-10-CM

## 2023-04-29 ENCOUNTER — NON-APPOINTMENT (OUTPATIENT)
Age: 33
End: 2023-04-29

## 2023-07-10 ENCOUNTER — APPOINTMENT (OUTPATIENT)
Dept: PAIN MANAGEMENT | Facility: CLINIC | Age: 33
End: 2023-07-10
Payer: COMMERCIAL

## 2023-07-10 VITALS — BODY MASS INDEX: 34.15 KG/M2 | WEIGHT: 200 LBS | HEIGHT: 64 IN

## 2023-07-10 PROCEDURE — 99204 OFFICE O/P NEW MOD 45 MIN: CPT

## 2023-07-10 NOTE — DISCUSSION/SUMMARY
[de-identified] : A discussion regarding available pain management treatment options occurred with the patient.  These included interventional, rehabilitative, pharmacological, and alternative modalities. We will proceed with the following:\par \par Interventional treatment options:\par Will proceed with a L3-4 LESI\par Treatment options were discussed with the patient. The patient has been having persistent lower back and lumbar radicular pain with minimal improvement with conservative therapies. Given that the patient has severe pain and failed conservative treatment, the patient was given the option to proceed with a lumbar epidural steroid injection to try to get some pain relief.  \par \par The risks and benefits were discussed which included bleeding, infection, nerve injury, no pain relief or worse, increased pain. All questions were answered and concerns addressed.\par \par Rehabilitative options:\par - participation in active HEP was discussed\par \par Medication based treatment options:\par Ordered gabapentin 300 mg to take 3 times a day\par recommend to take extra strength Tylenol 1000 every 8 hours \par \par Complementary treatment options:\par - Weight management and lifestyle modifications discussed\par \par follow up two weeks after injection\par \par I, Nohelia Redd, attest that this documentation has been prepared under the direction and in the presence of Provider Martínez Ivey DO\par The documentation recorded by the scribe, in my presence, accurately reflects the service I personally performed, and the decisions made by me with my edits as appropriate.\par \par Best Regards, \par Martínez Ivey D.O.\par \par \par

## 2023-07-10 NOTE — HISTORY OF PRESENT ILLNESS
[FreeTextEntry1] : HPI: Ms. Segovia is 33 year old female complaining of neck and low back pain. The pain started over 5 years ago and has worsened in the last year. The pain is moderate with intermittent pain that is worse in the evening. Patient presents with right-sided lumbar pain that is 6-7/10 worse with flexion and mildly improved with extension that is sharp, burning, tingling in nature and radiates down the buttock, anterior posterior thigh, leg, foot intermittently throughout the day for a year. The pain is worse when standing and comes on when coughing or sneezing. She has managed this pain with Tylenol has not significantly reduced the pain. \par \par As for her neck pain, patient presents with right-sided cervical neck pain that is 6-7/10 worse with flexion and mildly improved with extension that is sharp, burning, tingling in nature and radiates down the shoulder, arm, forearm intermittently throughout the day for a months. Patient does not tolerate NSAIDs due GI irritation. \par \par Pt denies bowel/bladder incontinence, weakness, falls, unsteadiness.\par

## 2023-07-10 NOTE — PHYSICAL EXAM
[de-identified] : Lumbar Spine Exam:\par \par \par Palpation:\par Midline lumbar tenderness:             (-)\par paraspinal tenderness:                  L (-) ; R (+)\par SI joint tenderness:                        L (-) ; R (-)\par GTB tenderness:                            L (-);  R (-)\par \par \par Special Tests:\par SLR:                           R (+) ; L (-)\par Facet loading:            R (-) ; L (-)\par JANET test:               R (-) ; L (-)\par Gaenslen's:               R (-) ; L (-)\par compression test:               R (-) ; L (-)\par \par \par Gait:\par non- antalgic gait\par \par Cervical Spine Exam:\par \par Inspection:\par \par \par Palpation:   \par                                                \par Cervical paraspinal mm tenderness:   R (-); L(-)\par Upper trapezius mm tenderness:        R (-); L (-)\par \par ROM:  \par Full ROM \par \par Special Testing:\par Spurling Test:                  R + ; L (-)\par Facet load test:               R (-); L (-)          \par \par \par  [de-identified] : /

## 2023-07-23 ENCOUNTER — LABORATORY RESULT (OUTPATIENT)
Age: 33
End: 2023-07-23

## 2023-07-24 ENCOUNTER — APPOINTMENT (OUTPATIENT)
Dept: OBGYN | Facility: CLINIC | Age: 33
End: 2023-07-24
Payer: COMMERCIAL

## 2023-07-24 VITALS
DIASTOLIC BLOOD PRESSURE: 74 MMHG | BODY MASS INDEX: 34.49 KG/M2 | HEIGHT: 64 IN | WEIGHT: 202 LBS | SYSTOLIC BLOOD PRESSURE: 108 MMHG

## 2023-07-24 PROCEDURE — 99395 PREV VISIT EST AGE 18-39: CPT

## 2023-07-24 NOTE — DISCUSSION/SUMMARY
[FreeTextEntry1] : Pap done\par Self breast exam stressed\par Prescribed hormone profile\par Advised patient to start prenatal vitamins\par Keep menstrual calendar\par Coital timing discussed/ovulation predictor kit\par If no conception in 6 months consider consultation with reproductive endocrinology\par Follow-up yearly or as needed

## 2023-07-24 NOTE — PHYSICAL EXAM

## 2023-07-24 NOTE — HISTORY OF PRESENT ILLNESS
[FreeTextEntry1] : Patient is 33 years old para 0-0-2-0 last menstrual period July 13, 2023.\par She notes regular menstrual periods every month and is present without complaints.\par Patient states that she recently started to try to conceive.

## 2023-07-25 ENCOUNTER — LABORATORY RESULT (OUTPATIENT)
Age: 33
End: 2023-07-25

## 2023-07-25 ENCOUNTER — APPOINTMENT (OUTPATIENT)
Dept: BREAST CENTER | Facility: CLINIC | Age: 33
End: 2023-07-25

## 2023-07-28 ENCOUNTER — APPOINTMENT (OUTPATIENT)
Dept: PAIN MANAGEMENT | Facility: CLINIC | Age: 33
End: 2023-07-28
Payer: COMMERCIAL

## 2023-07-28 PROCEDURE — 93770 DETERMINATION VENOUS PRESS: CPT

## 2023-07-28 PROCEDURE — 62323 NJX INTERLAMINAR LMBR/SAC: CPT

## 2023-07-28 PROCEDURE — 94761 N-INVAS EAR/PLS OXIMETRY MLT: CPT | Mod: 59

## 2023-07-28 NOTE — PROCEDURE
[FreeTextEntry3] : Date of Service: 07/28/2023 \par \par Account: [id number]\par \par Patient: ANN MARIE WINTER \par \par Date of Birth: Saurabh 15 1990\par \par Age: 33 year\par \par Pre-Operative Diagnosis:         Lumbosacral Radiculopathy (M54.16)\par \par Post Operative Diagnosis:        Lumbosacral Radiculopathy (M54.16)   \par \par Procedure:             Lumbar interlaminar (L3-L4) epidural steroid injection under fluoroscopic guidance\par \par Anesthesia:            none\par \par \par This procedure was carried out using fluoroscopic guidance.  The risks and benefits of the procedure were discussed extensively with the patient.  The consent of the patient was obtained and the following procedure was performed. The patient was placed in the prone position on the fluoroscopy table and the area was prepped and draped in a sterile fashion.  A timeout was performed with all essential staff present and the site and side were verified.\par \par The patient was placed in the prone position with a pillow under the abdomen to minimize the lumbar lordosis.  The lumbar area was prepped and draped in a sterile fashion.  Under A/P view with slight cephalad-caudad angulation, the L3-L4 interspace was identified and marked.  Using sterile technique the superficial skin was anesthetized with 1% Lidocaine.  A 20 gauge Tuohy needle was advanced into the epidural space under fluoroscopy using loss of resistance at the L3-L4 level.  After negative aspiration for heme or CSF, an epidurogram was obtained in the A/P and lateral fluoroscopic views using 2-3 cc of Omnipaque contrast confirming epidural placement of the needle.  After this, 5 cc of of a mixture of preservative free normal saline and 10mg of dexamethasone were injected into the epidural space. \par \par The needle was subsequently removed.  Vital signs remained normal.  Pulse oximeter was used throughout the procedure and the patient's pulse and oxygen saturation remained within normal limits.  The patient tolerated the procedure well.  There were no complications.  The patient was instructed to apply ice over the injection sites for twenty minutes every two hours for the next 24 to 48 hours.\par \par Disposition:\par \par 1. The patient was advised to F/U in 1-2 weeks to assess the response to the injection.\par 2. The patient was also instructed to contact me immediately if there were any concerns related to the procedure performed.\par

## 2023-08-11 ENCOUNTER — APPOINTMENT (OUTPATIENT)
Dept: OBGYN | Facility: CLINIC | Age: 33
End: 2023-08-11

## 2023-08-14 ENCOUNTER — APPOINTMENT (OUTPATIENT)
Dept: PAIN MANAGEMENT | Facility: CLINIC | Age: 33
End: 2023-08-14

## 2023-09-21 ENCOUNTER — APPOINTMENT (OUTPATIENT)
Dept: BREAST CENTER | Facility: CLINIC | Age: 33
End: 2023-09-21
Payer: COMMERCIAL

## 2023-09-21 VITALS
HEIGHT: 64 IN | DIASTOLIC BLOOD PRESSURE: 81 MMHG | TEMPERATURE: 97.2 F | WEIGHT: 192 LBS | SYSTOLIC BLOOD PRESSURE: 130 MMHG | BODY MASS INDEX: 32.78 KG/M2

## 2023-09-21 PROCEDURE — 99213 OFFICE O/P EST LOW 20 MIN: CPT

## 2023-09-21 NOTE — ED ADULT NURSE NOTE - CHIEF COMPLAINT
----- Message from Lilian Dickinson MD sent at 9/21/2023  6:30 AM EDT -----  Please notify patient that laboratory studies were reviewed and suggest for lipid levels to remain stable.   Continue as directed presently
Called patient regarding above
The patient is a 29y Female complaining of neck pain.

## 2023-09-28 ENCOUNTER — APPOINTMENT (OUTPATIENT)
Dept: BREAST CENTER | Facility: CLINIC | Age: 33
End: 2023-09-28
Payer: COMMERCIAL

## 2023-09-28 VITALS
SYSTOLIC BLOOD PRESSURE: 112 MMHG | HEART RATE: 67 BPM | BODY MASS INDEX: 32.78 KG/M2 | WEIGHT: 192 LBS | DIASTOLIC BLOOD PRESSURE: 79 MMHG | HEIGHT: 64 IN

## 2023-09-28 PROCEDURE — 99213 OFFICE O/P EST LOW 20 MIN: CPT

## 2023-10-05 ENCOUNTER — APPOINTMENT (OUTPATIENT)
Dept: BREAST CENTER | Facility: CLINIC | Age: 33
End: 2023-10-05
Payer: COMMERCIAL

## 2023-10-05 VITALS
DIASTOLIC BLOOD PRESSURE: 70 MMHG | WEIGHT: 190 LBS | HEIGHT: 64 IN | BODY MASS INDEX: 32.44 KG/M2 | SYSTOLIC BLOOD PRESSURE: 118 MMHG

## 2023-10-05 PROCEDURE — 99213 OFFICE O/P EST LOW 20 MIN: CPT

## 2023-10-07 ENCOUNTER — APPOINTMENT (OUTPATIENT)
Dept: PAIN MANAGEMENT | Facility: CLINIC | Age: 33
End: 2023-10-07
Payer: COMMERCIAL

## 2023-10-07 PROCEDURE — 99214 OFFICE O/P EST MOD 30 MIN: CPT | Mod: 95

## 2023-10-19 ENCOUNTER — NON-APPOINTMENT (OUTPATIENT)
Age: 33
End: 2023-10-19

## 2023-10-30 ENCOUNTER — APPOINTMENT (OUTPATIENT)
Dept: OBGYN | Facility: CLINIC | Age: 33
End: 2023-10-30

## 2023-11-04 ENCOUNTER — APPOINTMENT (OUTPATIENT)
Dept: PAIN MANAGEMENT | Facility: CLINIC | Age: 33
End: 2023-11-04

## 2023-11-07 ENCOUNTER — APPOINTMENT (OUTPATIENT)
Dept: OBGYN | Facility: CLINIC | Age: 33
End: 2023-11-07

## 2023-12-04 ENCOUNTER — APPOINTMENT (OUTPATIENT)
Dept: OBGYN | Facility: CLINIC | Age: 33
End: 2023-12-04

## 2024-01-22 ENCOUNTER — APPOINTMENT (OUTPATIENT)
Dept: OBGYN | Facility: CLINIC | Age: 34
End: 2024-01-22
Payer: COMMERCIAL

## 2024-01-22 ENCOUNTER — LABORATORY RESULT (OUTPATIENT)
Age: 34
End: 2024-01-22

## 2024-01-22 VITALS — HEIGHT: 64 IN

## 2024-01-22 PROCEDURE — 57454 BX/CURETT OF CERVIX W/SCOPE: CPT

## 2024-01-22 NOTE — PROCEDURE
[Colposcopy] : Colposcopy  [Time out performed] : Pre-procedure time out performed.  Patient's name, date of birth and procedure confirmed. [Risks] : risks [Consent Obtained] : Consent obtained [Benefits] : benefits [Alternatives] : alternatives [Patient] : patient [Infection] : infection [Bleeding] : bleeding [Allergic Reaction] : allergic reaction [No Premedication] : no premedication [HPV High Risk] : HPV high risk [Colposcopy Adequate] : colposcopy adequate [Pap Performed] : pap not performed [SCI Fully Visualized] : SCI fully visualized [ECC Performed] : ECC performed [No Abnormalities] : no abnormalities [Biopsy] : biopsy taken [Hemostasis Obtained] : Hemostasis obtained [Tolerated Well] : the patient tolerated the procedure well [de-identified] : 1 [de-identified] : 12:00 [de-identified] : Acetowhite changes noted

## 2024-01-25 ENCOUNTER — LABORATORY RESULT (OUTPATIENT)
Age: 34
End: 2024-01-25

## 2024-01-25 RX ORDER — DOXYCYCLINE HYCLATE 100 MG/1
100 TABLET ORAL
Qty: 14 | Refills: 0 | Status: COMPLETED | COMMUNITY
Start: 2024-01-25 | End: 2024-02-01

## 2024-01-26 ENCOUNTER — APPOINTMENT (OUTPATIENT)
Dept: SURGERY | Facility: CLINIC | Age: 34
End: 2024-01-26
Payer: COMMERCIAL

## 2024-01-26 VITALS
WEIGHT: 200 LBS | DIASTOLIC BLOOD PRESSURE: 80 MMHG | BODY MASS INDEX: 34.15 KG/M2 | TEMPERATURE: 97.3 F | SYSTOLIC BLOOD PRESSURE: 118 MMHG | HEIGHT: 64 IN | OXYGEN SATURATION: 98 % | HEART RATE: 108 BPM

## 2024-01-26 PROCEDURE — 21930 EXC BACK LES SC < 3 CM: CPT

## 2024-01-26 PROCEDURE — 99213 OFFICE O/P EST LOW 20 MIN: CPT | Mod: 57

## 2024-01-26 NOTE — ASSESSMENT
[FreeTextEntry1] : Ms. ANN MARIE PÉREZ is a 33 year  year old woman. She presented to the office for the first time on 01/26/2024 , her primary is SD HUDDLESTON . has left buttock mass gets bigger and smaller   impression : inclusion cyst  The various options were explained to the patient. The Risk , benefit and alternatives were discussed. We discussed recovery and possible complications. The Post operative care was explained to the patient. She was counselled on diet , exercise and wound care. We discussed the pathology and surgery with her. We explained in great detail the pathophysiology of the disease process. We discussed the workup for diagnosis and management. The Post operative care was explained to the patient. He was counselled on diet , exercise and wound care. The Procedure was explained to the patient. The Risk , benefit and alternatives were discussed. We discussed recovery and possible complications. We discussed complications including sever complications like injury of the surrounding organs like the bile duct. We discussed about the benefits and disadvantage of converting the laparoscopic surgery to open. We also discussed about post cholecystectomy syndrome and  symptom management after surgery.  A consent was taken before the procedure.  After a time out and cleaning the area with a antiseptic solution, an injection of local anesthesia the incision was made around the most prominent area with a scalpel. The incision was then made deeper and the lesion was excised with a portion of the surrounded areolar tissue. Caution was taken to not enter the lesion. Once the lesion was removed it was sent of to pathology.  The area was irrigated and hemostasis was confirmed.   The incision was closed with absorbable sutures with a combination of Vicryl for deep dermal and Nylon for the epidermis. Steri-Strips , gauze and tape was put over it.   We discussed for over 30 min, including her present medical conditions, medications and if they need to be changed, the medications she is on and various surgical and non-surgical options. The Risk, benefit and alternatives were discussed. We discussed recovery and possible complications. her radiological images and labs were independently reviewed in the PACS by me. The Images were reviewed with her .   We discussed the importance of close follow up. We informed that she needs to follow up in  2 weeks. We also informed that she can call us if anything changes or has any questions.     Manju Leyva MD Minimally Invasive Surgery Foregut and [facility]  [facility]. [address] , [address], [place] [address] Phone: [phone] Fax: [phone]

## 2024-01-26 NOTE — PHYSICAL EXAM
[JVD] : no jugular venous distention  [Purpura] : no purpura  [Alert] : alert [de-identified] : Normal  [Calm] : calm [de-identified] : Normal  [de-identified] : Normal

## 2024-01-26 NOTE — HISTORY OF PRESENT ILLNESS
[de-identified] : Ms. ANN MARIE PÉREZ is a 33 year  year old woman. She presented to the office for the first time on 01/26/2024 , her primary is SD HUDDLESTON . has left butock mass gets bigger and smaller

## 2024-01-29 ENCOUNTER — LABORATORY RESULT (OUTPATIENT)
Age: 34
End: 2024-01-29

## 2024-02-08 ENCOUNTER — LABORATORY RESULT (OUTPATIENT)
Age: 34
End: 2024-02-08

## 2024-02-09 ENCOUNTER — APPOINTMENT (OUTPATIENT)
Dept: SURGERY | Facility: CLINIC | Age: 34
End: 2024-02-09
Payer: COMMERCIAL

## 2024-02-09 VITALS — WEIGHT: 203 LBS | BODY MASS INDEX: 34.66 KG/M2 | HEIGHT: 64 IN

## 2024-02-09 PROCEDURE — 99024 POSTOP FOLLOW-UP VISIT: CPT

## 2024-02-12 ENCOUNTER — APPOINTMENT (OUTPATIENT)
Dept: INTERNAL MEDICINE | Facility: CLINIC | Age: 34
End: 2024-02-12

## 2024-02-12 ENCOUNTER — OUTPATIENT (OUTPATIENT)
Dept: OUTPATIENT SERVICES | Facility: HOSPITAL | Age: 34
LOS: 1 days | End: 2024-02-12
Payer: COMMERCIAL

## 2024-02-12 VITALS
TEMPERATURE: 98.4 F | WEIGHT: 207 LBS | HEIGHT: 64 IN | BODY MASS INDEX: 35.34 KG/M2 | HEART RATE: 72 BPM | SYSTOLIC BLOOD PRESSURE: 123 MMHG | OXYGEN SATURATION: 100 % | DIASTOLIC BLOOD PRESSURE: 78 MMHG

## 2024-02-12 DIAGNOSIS — M41.9 SCOLIOSIS, UNSPECIFIED: ICD-10-CM

## 2024-02-12 DIAGNOSIS — Z00.00 ENCOUNTER FOR GENERAL ADULT MEDICAL EXAMINATION WITHOUT ABNORMAL FINDINGS: ICD-10-CM

## 2024-02-12 PROCEDURE — 99204 OFFICE O/P NEW MOD 45 MIN: CPT

## 2024-02-12 RX ORDER — METHYLPREDNISOLONE 4 MG/1
4 TABLET ORAL
Qty: 1 | Refills: 0 | Status: DISCONTINUED | COMMUNITY
Start: 2023-10-07 | End: 2024-02-12

## 2024-02-12 RX ORDER — FLUCONAZOLE 150 MG/1
150 TABLET ORAL
Qty: 1 | Refills: 0 | Status: DISCONTINUED | COMMUNITY
Start: 2022-05-26 | End: 2024-02-12

## 2024-02-12 RX ORDER — VALACYCLOVIR 500 MG/1
500 TABLET, FILM COATED ORAL DAILY
Qty: 90 | Refills: 3 | Status: DISCONTINUED | COMMUNITY
Start: 2024-01-25 | End: 2024-02-12

## 2024-02-12 RX ORDER — VALACYCLOVIR 500 MG/1
500 TABLET, FILM COATED ORAL DAILY
Qty: 90 | Refills: 3 | Status: DISCONTINUED | COMMUNITY
Start: 2022-05-26 | End: 2024-02-12

## 2024-02-12 RX ORDER — AMOXICILLIN AND CLAVULANATE POTASSIUM 875; 125 MG/1; MG/1
875-125 TABLET, COATED ORAL TWICE DAILY
Qty: 14 | Refills: 0 | Status: DISCONTINUED | COMMUNITY
Start: 2023-09-21 | End: 2024-02-12

## 2024-02-12 RX ORDER — SULFAMETHOXAZOLE AND TRIMETHOPRIM 800; 160 MG/1; MG/1
800-160 TABLET ORAL TWICE DAILY
Qty: 14 | Refills: 1 | Status: DISCONTINUED | COMMUNITY
Start: 2022-02-24 | End: 2024-02-12

## 2024-02-12 RX ORDER — CLOTRIMAZOLE AND BETAMETHASONE DIPROPIONATE 10; .5 MG/G; MG/G
1-0.05 CREAM TOPICAL TWICE DAILY
Qty: 1 | Refills: 0 | Status: DISCONTINUED | COMMUNITY
Start: 2022-05-26 | End: 2024-02-12

## 2024-02-12 RX ORDER — MELOXICAM 15 MG/1
15 TABLET ORAL DAILY
Qty: 30 | Refills: 0 | Status: DISCONTINUED | COMMUNITY
Start: 2023-10-07 | End: 2024-02-12

## 2024-02-12 RX ORDER — VALACYCLOVIR 500 MG/1
500 TABLET, FILM COATED ORAL DAILY
Qty: 7 | Refills: 2 | Status: ACTIVE | COMMUNITY
Start: 2023-07-11 | End: 1900-01-01

## 2024-02-12 RX ORDER — GABAPENTIN 100 MG/1
100 CAPSULE ORAL 3 TIMES DAILY
Qty: 90 | Refills: 1 | Status: DISCONTINUED | COMMUNITY
Start: 2023-07-10 | End: 2024-02-12

## 2024-02-12 RX ORDER — ZINC OXIDE 13 %
CREAM (GRAM) TOPICAL
Qty: 1 | Refills: 1 | Status: DISCONTINUED | COMMUNITY
Start: 2022-02-24 | End: 2024-02-12

## 2024-02-12 RX ORDER — METRONIDAZOLE 7.5 MG/G
0.75 GEL VAGINAL
Qty: 1 | Refills: 0 | Status: DISCONTINUED | COMMUNITY
Start: 2022-05-26 | End: 2024-02-12

## 2024-02-12 NOTE — PHYSICAL EXAM
[No Acute Distress] : no acute distress [Well Nourished] : well nourished [Well Developed] : well developed [Normal Sclera/Conjunctiva] : normal sclera/conjunctiva [PERRL] : pupils equal round and reactive to light [Normal] : the outer ears and nose were normal in appearance and the oropharynx was normal [No Lymphadenopathy] : no lymphadenopathy [No Respiratory Distress] : no respiratory distress  [No Accessory Muscle Use] : no accessory muscle use [Clear to Auscultation] : lungs were clear to auscultation bilaterally [Normal Rate] : normal rate  [Regular Rhythm] : with a regular rhythm [No Edema] : there was no peripheral edema [Soft] : abdomen soft [Non Tender] : non-tender [Non-distended] : non-distended [Normal Posterior Cervical Nodes] : no posterior cervical lymphadenopathy [Normal Anterior Cervical Nodes] : no anterior cervical lymphadenopathy [No Spinal Tenderness] : no spinal tenderness [No Joint Swelling] : no joint swelling [No Rash] : no rash [Normal Affect] : the affect was normal [Normal Insight/Judgement] : insight and judgment were intact

## 2024-02-12 NOTE — ASSESSMENT
[FreeTextEntry1] : 33 years old female with past medical history of gastric bypass surgery, anemia, cosmetic breast surgery presents to establish care.  #Likely Benign Lymphocytic mass in the buttock (Left) -s/p Surgery  - Follows Dr Leyva  -Has appointment on Friday.   # Recurrent HSV 2 -Takes Valcyclovir 500 mg BID ( was advised to take once daily but she says it is more recurrent when taken once daily )  -   #Anemia:  -Hb 10.1  - most likely from gastric bypass surgery -No symptoms  - No heavy menses - Follows proper diet  Will follow CBC   # Lumbar back pain  - Not taking any medications  - Physical therapy referral given   #Obesity:  - Diet and exercise counselled .    #HCM  -GYN : annual Gyn examination on 24th july, 2023 PAP showed chronic cervicitis , HPV RNA detected >>Follow GYN appointments  - Vaccines: uptodate   -Follow up in 6 months or PRN

## 2024-02-12 NOTE — HISTORY OF PRESENT ILLNESS
[FreeTextEntry1] : Here to establish care  [de-identified] : 33 years old female with past medical history of HSV2 on Valcyclovir,  gastric bypass surgery, anemia, cosmetic breast surgery, Lumbar radiculopathy and bulging discs in lumbar spine, Lumbar scoliosis presents to establish care.  No active issues during this visit.  Patient is trying to conceive a baby.  No chest pain, SOB, lethargy.

## 2024-02-13 ENCOUNTER — OUTPATIENT (OUTPATIENT)
Dept: OUTPATIENT SERVICES | Facility: HOSPITAL | Age: 34
LOS: 1 days | End: 2024-02-13
Payer: COMMERCIAL

## 2024-02-13 DIAGNOSIS — Z00.00 ENCOUNTER FOR GENERAL ADULT MEDICAL EXAMINATION WITHOUT ABNORMAL FINDINGS: ICD-10-CM

## 2024-02-13 DIAGNOSIS — Z98.84 BARIATRIC SURGERY STATUS: Chronic | ICD-10-CM

## 2024-02-13 PROCEDURE — 85027 COMPLETE CBC AUTOMATED: CPT

## 2024-02-13 PROCEDURE — 86762 RUBELLA ANTIBODY: CPT

## 2024-02-13 PROCEDURE — 86765 RUBEOLA ANTIBODY: CPT

## 2024-02-13 PROCEDURE — 80053 COMPREHEN METABOLIC PANEL: CPT

## 2024-02-13 PROCEDURE — 80061 LIPID PANEL: CPT

## 2024-02-13 PROCEDURE — 83036 HEMOGLOBIN GLYCOSYLATED A1C: CPT

## 2024-02-13 PROCEDURE — 84443 ASSAY THYROID STIM HORMONE: CPT

## 2024-02-13 PROCEDURE — 86735 MUMPS ANTIBODY: CPT

## 2024-02-13 PROCEDURE — 86787 VARICELLA-ZOSTER ANTIBODY: CPT

## 2024-02-13 PROCEDURE — 86480 TB TEST CELL IMMUN MEASURE: CPT

## 2024-02-14 DIAGNOSIS — Z00.00 ENCOUNTER FOR GENERAL ADULT MEDICAL EXAMINATION WITHOUT ABNORMAL FINDINGS: ICD-10-CM

## 2024-02-14 DIAGNOSIS — N72 INFLAMMATORY DISEASE OF CERVIX UTERI: ICD-10-CM

## 2024-02-14 DIAGNOSIS — D64.9 ANEMIA, UNSPECIFIED: ICD-10-CM

## 2024-02-14 DIAGNOSIS — M54.16 RADICULOPATHY, LUMBAR REGION: ICD-10-CM

## 2024-02-14 DIAGNOSIS — M41.9 SCOLIOSIS, UNSPECIFIED: ICD-10-CM

## 2024-02-16 ENCOUNTER — APPOINTMENT (OUTPATIENT)
Dept: SURGERY | Facility: CLINIC | Age: 34
End: 2024-02-16

## 2024-02-16 ENCOUNTER — OUTPATIENT (OUTPATIENT)
Dept: OUTPATIENT SERVICES | Facility: HOSPITAL | Age: 34
LOS: 1 days | End: 2024-02-16
Payer: COMMERCIAL

## 2024-02-16 ENCOUNTER — APPOINTMENT (OUTPATIENT)
Dept: INTERNAL MEDICINE | Facility: CLINIC | Age: 34
End: 2024-02-16

## 2024-02-16 DIAGNOSIS — Z98.84 BARIATRIC SURGERY STATUS: Chronic | ICD-10-CM

## 2024-02-16 DIAGNOSIS — E66.9 OBESITY, UNSPECIFIED: ICD-10-CM

## 2024-02-16 DIAGNOSIS — B00.9 HERPESVIRAL INFECTION, UNSPECIFIED: ICD-10-CM

## 2024-02-16 DIAGNOSIS — Z00.00 ENCOUNTER FOR GENERAL ADULT MEDICAL EXAMINATION WITHOUT ABNORMAL FINDINGS: ICD-10-CM

## 2024-02-16 LAB
ALBUMIN SERPL ELPH-MCNC: 4.2 G/DL
ALP BLD-CCNC: 80 U/L
ALT SERPL-CCNC: 18 U/L
ANION GAP SERPL CALC-SCNC: 11 MMOL/L
AST SERPL-CCNC: 19 U/L
BASOPHILS # BLD AUTO: 0.03 K/UL
BASOPHILS NFR BLD AUTO: 0.4 %
BILIRUB SERPL-MCNC: 0.2 MG/DL
BUN SERPL-MCNC: 9 MG/DL
CALCIUM SERPL-MCNC: 8.8 MG/DL
CHLORIDE SERPL-SCNC: 102 MMOL/L
CHOLEST SERPL-MCNC: 211 MG/DL
CO2 SERPL-SCNC: 23 MMOL/L
CREAT SERPL-MCNC: 0.7 MG/DL
EGFR: 116 ML/MIN/1.73M2
EOSINOPHIL # BLD AUTO: 0.18 K/UL
EOSINOPHIL NFR BLD AUTO: 2.6 %
ESTIMATED AVERAGE GLUCOSE: 114 MG/DL
GLUCOSE SERPL-MCNC: 89 MG/DL
HBA1C MFR BLD HPLC: 5.6 %
HCT VFR BLD CALC: 32.8 %
HDLC SERPL-MCNC: 47 MG/DL
HGB BLD-MCNC: 9.6 G/DL
IMM GRANULOCYTES NFR BLD AUTO: 0.3 %
LDLC SERPL CALC-MCNC: 129 MG/DL
LYMPHOCYTES # BLD AUTO: 2.7 K/UL
LYMPHOCYTES NFR BLD AUTO: 38.5 %
M TB IFN-G BLD-IMP: NEGATIVE
MAN DIFF?: NORMAL
MCHC RBC-ENTMCNC: 21.3 PG
MCHC RBC-ENTMCNC: 29.3 G/DL
MCV RBC AUTO: 72.7 FL
MEV IGG FLD QL IA: 56.5 AU/ML
MEV IGG+IGM SER-IMP: POSITIVE
MONOCYTES # BLD AUTO: 0.45 K/UL
MONOCYTES NFR BLD AUTO: 6.4 %
MUV AB SER-ACNC: POSITIVE
MUV IGG SER QL IA: 54.1 AU/ML
NEUTROPHILS # BLD AUTO: 3.64 K/UL
NEUTROPHILS NFR BLD AUTO: 51.8 %
NONHDLC SERPL-MCNC: 164 MG/DL
PLATELET # BLD AUTO: 384 K/UL
PMV BLD AUTO: 0 /100 WBCS
POTASSIUM SERPL-SCNC: 4.1 MMOL/L
PROT SERPL-MCNC: 7.5 G/DL
QUANTIFERON TB PLUS MITOGEN MINUS NIL: 6.01 IU/ML
QUANTIFERON TB PLUS NIL: 0.02 IU/ML
QUANTIFERON TB PLUS TB1 MINUS NIL: 0 IU/ML
QUANTIFERON TB PLUS TB2 MINUS NIL: 0 IU/ML
RBC # BLD: 4.51 M/UL
RBC # FLD: 16.6 %
RUBV IGG FLD-ACNC: 1.8 INDEX
RUBV IGG SER-IMP: POSITIVE
SODIUM SERPL-SCNC: 136 MMOL/L
TRIGL SERPL-MCNC: 173 MG/DL
TSH SERPL-ACNC: 1.67 UIU/ML
VZV AB TITR SER: POSITIVE
VZV IGG SER IF-ACNC: 1154 INDEX
WBC # FLD AUTO: 7.02 K/UL

## 2024-02-16 PROCEDURE — 99214 OFFICE O/P EST MOD 30 MIN: CPT

## 2024-02-16 NOTE — ASSESSMENT
[FreeTextEntry1] : 33 years old female with past medical history of gastric bypass surgery, anemia, cosmetic breast surgery  #Likely Benign Lymphocytic mass in the buttock (Left) -s/p Surgery - Follows Dr Leyva -awaiting final pathology - follow up surgery   # Recurrent HSV 2 -Takes Valcyclovir 500 mg BID ( was advised to take once daily but she says it is more recurrent when taken once daily )  #HLD #Obesity  -, CHol 211,  (2/24) -Diet and lifestyle modifications discussed   #Anemia: -Hb 9.6, microcytic - most likely from gastric bypass surgery -No symptoms - No heavy menses - Follows proper diet -start iron supplements  -Fu iron studies   # Lumbar back pain - Not taking any medications - Physical therapy referral given  #Obesity: - Diet and exercise counselled.   #HCM -GYN : annual Gyn examination on 24th july, 2023 PAP showed chronic cervicitis , HPV RNA detected >>Follow GYN appointments - Vaccines: uptodate -Follow up in 3 months or PRN.

## 2024-02-16 NOTE — HISTORY OF PRESENT ILLNESS
[FreeTextEntry1] : follow up  [de-identified] : 33 years old female with past medical history of HSV2 on Valcyclovir, gastric bypass surgery, anemia, cosmetic breast surgery, Lumbar radiculopathy and bulging discs in lumbar spine, Lumbar scoliosis presents to fill out paperwork for [employer] school.

## 2024-02-22 DIAGNOSIS — E66.9 OBESITY, UNSPECIFIED: ICD-10-CM

## 2024-02-22 DIAGNOSIS — D64.9 ANEMIA, UNSPECIFIED: ICD-10-CM

## 2024-02-22 DIAGNOSIS — B00.9 HERPESVIRAL INFECTION, UNSPECIFIED: ICD-10-CM

## 2024-02-22 DIAGNOSIS — R22.2 LOCALIZED SWELLING, MASS AND LUMP, TRUNK: ICD-10-CM

## 2024-02-23 ENCOUNTER — APPOINTMENT (OUTPATIENT)
Dept: SURGERY | Facility: CLINIC | Age: 34
End: 2024-02-23
Payer: COMMERCIAL

## 2024-02-23 VITALS
SYSTOLIC BLOOD PRESSURE: 122 MMHG | OXYGEN SATURATION: 99 % | TEMPERATURE: 97.7 F | DIASTOLIC BLOOD PRESSURE: 80 MMHG | HEART RATE: 103 BPM | HEIGHT: 64 IN

## 2024-02-23 PROCEDURE — 99024 POSTOP FOLLOW-UP VISIT: CPT

## 2024-02-23 RX ORDER — AMOXICILLIN AND CLAVULANATE POTASSIUM 875; 125 MG/1; MG/1
875-125 TABLET, COATED ORAL
Qty: 14 | Refills: 0 | Status: ACTIVE | COMMUNITY
Start: 2024-02-23 | End: 1900-01-01

## 2024-02-23 NOTE — PHYSICAL EXAM
[de-identified] : Normal  [de-identified] : Well-healing surgical incision, about 1 inch in size in left buttock region. It is clean and dry, with no evidence of hematoma or infection.

## 2024-02-23 NOTE — END OF VISIT
[FreeTextEntry3] : All medical record entries made by the Scribe were at my, Manju Leyva MD, direction and personally dictated by me on 02/09/2024. I have reviewed the chart and agree that the record accurately reflects my personal performance of the history, physical exam, assessment and plan. I have also personally directed, reviewed, and agreed with the chart.

## 2024-02-23 NOTE — HISTORY OF PRESENT ILLNESS
[de-identified] : Ms. Kiah Segovia is a 33 year old woman. She presented to the office for the first time on 01/26/2024 , her primary is Holden Leon. has left buttock mass gets bigger and smaller 2/9/24: Since our procedure, patient has been doing well Wound has healed well  2/23/24: The patient returns to the office for a post-operative visit after her recent excision of left buttock mass on 1/26. She only complains of scant serous drainage from the surgical site. She reports it is  to the touch.

## 2024-02-23 NOTE — ADDENDUM
[FreeTextEntry1] : I, Jasbir Metzger, documented this note as a scribe on behalf of Manju Leyva MD on 02/09/2024.

## 2024-02-23 NOTE — ASSESSMENT
[FreeTextEntry1] : Ms. Kiah Segovia is a 33 year old woman. She presented to the office for the first time on 01/26/2024 , her primary is Holden Leon. has left buttock mass gets bigger and smaller 2/9/24: Since our procedure, patient has been doing well Wound has healed well  2/23/24: The patient returns to the office for a post-operative visit after her recent excision of left buttock mass on 1/26. She otherwise looks and feels well. In regard to surgical site drainage and tenderness, she was reassured, but Augmentin was sent to her pharmacy should this worsen. We reviewed full local care. Pathology was noted and discussed. The lesion is benign and has been excised completely. She will return to the office in 2 weeks for re-exam, or sooner as needed. She is happy with the assessment and plan. All of her questions were answered.      We discussed the importance of close follow up. We informed that she needs to follow up in 2 weeks.  We also informed that she can call us if anything changes or has any questions.

## 2024-02-27 RX ORDER — VALACYCLOVIR 500 MG/1
500 TABLET, FILM COATED ORAL DAILY
Qty: 90 | Refills: 1 | Status: ACTIVE | COMMUNITY
Start: 2024-02-27 | End: 1900-01-01

## 2024-03-17 ENCOUNTER — LABORATORY RESULT (OUTPATIENT)
Age: 34
End: 2024-03-17

## 2024-03-18 ENCOUNTER — LABORATORY RESULT (OUTPATIENT)
Age: 34
End: 2024-03-18

## 2024-03-18 ENCOUNTER — APPOINTMENT (OUTPATIENT)
Dept: SURGERY | Facility: CLINIC | Age: 34
End: 2024-03-18

## 2024-03-18 VITALS
OXYGEN SATURATION: 99 % | HEART RATE: 99 BPM | SYSTOLIC BLOOD PRESSURE: 110 MMHG | DIASTOLIC BLOOD PRESSURE: 74 MMHG | TEMPERATURE: 97 F | WEIGHT: 208 LBS | BODY MASS INDEX: 35.51 KG/M2 | HEIGHT: 64 IN

## 2024-03-18 NOTE — ASSESSMENT
[FreeTextEntry1] : Ms. Kiah Segovia is a 33 year old woman. She presented to the office for the first time on 01/26/2024 , her primary is Holden Leon. has left buttock mass gets bigger and smaller 2/9/24: Since our procedure, patient has been doing well Wound has healed well  Impression: likely benign lymphocytic tissue but pending further testing  Awaiting final pathology - but likely benign    We discussed for over 20  min, including her present medical conditions, medications and if they need to be changed, the medications she is on and various surgical and non-surgical options. The Risk, benefit and alternatives were discussed. We discussed recovery and possible complications. her radiological images and labs were independently reviewed in the PACS by me. The Images were reviewed with her .   We discussed the importance of close follow up. We informed that she needs to follow up in 2 weeks. We also informed that she can call us if anything changes or has any questions.     Manju Leyva MD Minimally Invasive Surgery Foregut and [facility]  [facility]. [address] , [address] [place] [address] Phone: [phone] Fax: [phone]

## 2024-03-18 NOTE — HISTORY OF PRESENT ILLNESS
[de-identified] : Ms. Kiah Segovia is a 33 year old woman. She presented to the office for the first time on 01/26/2024 , her primary is Holden Leon. has left buttock mass gets bigger and smaller 2/9/24: Since our procedure, patient has been doing well Wound has healed well

## 2024-03-18 NOTE — END OF VISIT
PA for Tafinlar and Mekinist approved thru [employer] system with the code [id number]. Approval good thru 6/19/24.  Need to get pricing information.    Fidelia Schwartz  Specialty Pharmacy Technician        
[FreeTextEntry3] : All medical record entries made by the Scribe were at my, Manju Leyva MD, direction and personally dictated by me on 02/09/2024. I have reviewed the chart and agree that the record accurately reflects my personal performance of the history, physical exam, assessment and plan. I have also personally directed, reviewed, and agreed with the chart.

## 2024-03-18 NOTE — PHYSICAL EXAM
[Alert] : alert [Calm] : calm [JVD] : no jugular venous distention  [Purpura] : no purpura  [de-identified] : Normal  [de-identified] : Normal  [de-identified] : Normal  [de-identified] : left buttock with 1 inch linear scar, healed well took out suture but at suture site 1 mL of serous purulent fluid came out, non foul smelling

## 2024-03-21 ENCOUNTER — LABORATORY RESULT (OUTPATIENT)
Age: 34
End: 2024-03-21

## 2024-03-22 ENCOUNTER — APPOINTMENT (OUTPATIENT)
Dept: SURGERY | Facility: CLINIC | Age: 34
End: 2024-03-22
Payer: COMMERCIAL

## 2024-03-22 VITALS
TEMPERATURE: 97 F | SYSTOLIC BLOOD PRESSURE: 118 MMHG | HEART RATE: 106 BPM | BODY MASS INDEX: 35.51 KG/M2 | OXYGEN SATURATION: 98 % | DIASTOLIC BLOOD PRESSURE: 68 MMHG | HEIGHT: 64 IN | WEIGHT: 208 LBS

## 2024-03-22 PROCEDURE — 99024 POSTOP FOLLOW-UP VISIT: CPT

## 2024-03-23 ENCOUNTER — LABORATORY RESULT (OUTPATIENT)
Age: 34
End: 2024-03-23

## 2024-03-27 ENCOUNTER — LABORATORY RESULT (OUTPATIENT)
Age: 34
End: 2024-03-27

## 2024-03-28 ENCOUNTER — APPOINTMENT (OUTPATIENT)
Dept: OBGYN | Facility: CLINIC | Age: 34
End: 2024-03-28
Payer: COMMERCIAL

## 2024-03-28 VITALS — WEIGHT: 204 LBS | HEIGHT: 64 IN | BODY MASS INDEX: 34.83 KG/M2

## 2024-03-28 PROCEDURE — 99213 OFFICE O/P EST LOW 20 MIN: CPT | Mod: 25

## 2024-03-28 PROCEDURE — 76830 TRANSVAGINAL US NON-OB: CPT

## 2024-03-28 NOTE — PHYSICAL EXAM
[Chaperone Present] : A chaperone was present in the examining room during all aspects of the physical examination [Appropriately responsive] : appropriately responsive [Alert] : alert [No Acute Distress] : no acute distress [Regular Rate Rhythm] : regular rate rhythm [No Lymphadenopathy] : no lymphadenopathy [Clear to Auscultation B/L] : clear to auscultation bilaterally [No Murmurs] : no murmurs [Soft] : soft [Non-tender] : non-tender [Non-distended] : non-distended [No HSM] : No HSM [No Lesions] : no lesions [Oriented x3] : oriented x3 [No Mass] : no mass [Labia Minora] : normal [Labia Majora] : normal [Normal] : normal [Uterine Adnexae] : normal [FreeTextEntry1] : Elba

## 2024-03-28 NOTE — HISTORY OF PRESENT ILLNESS
[FreeTextEntry1] : Patient is 33 years old para 0-0-2-0 last menstrual period February 13, 2024. Patient states that she has a history of 32-day cycles.  She denies pain or bleeding.  She is accompanied by her  Quantitative beta-hCG on March 18, 2024= 29.5 Quantitative beta-hCG on March 23, 2024= 406.7 Quantitative beta-hCG on March 27, 2024= 2060.0 Progesterone level was noted to be 18.6 and blood type O+

## 2024-03-28 NOTE — PROCEDURE
[Anteverted] : anteverted [Transvaginal Ultrasound] : transvaginal ultrasound [Amenorrhea] : Amenorrhea [L: ___ cm] : L: [unfilled] cm [No Fibroid(s)] : no fibroid(s) [H: ___ cm] : H: [unfilled] cm [FreeTextEntry5] : Gestational sac noted [FreeTextEntry7] : 2.2 x 3.0 cm [FreeTextEntry8] : 2.0 x 2.8 cm

## 2024-04-04 ENCOUNTER — APPOINTMENT (OUTPATIENT)
Dept: OBGYN | Facility: CLINIC | Age: 34
End: 2024-04-04
Payer: COMMERCIAL

## 2024-04-04 VITALS
SYSTOLIC BLOOD PRESSURE: 100 MMHG | DIASTOLIC BLOOD PRESSURE: 68 MMHG | BODY MASS INDEX: 35 KG/M2 | WEIGHT: 205 LBS | HEIGHT: 64 IN

## 2024-04-04 DIAGNOSIS — N92.6 IRREGULAR MENSTRUATION, UNSPECIFIED: ICD-10-CM

## 2024-04-04 PROCEDURE — 76830 TRANSVAGINAL US NON-OB: CPT

## 2024-04-04 PROCEDURE — 99213 OFFICE O/P EST LOW 20 MIN: CPT | Mod: 25

## 2024-04-04 NOTE — DISCUSSION/SUMMARY
[FreeTextEntry1] : Continue prenatal vitamins Dietary advice Schedule follow-up appointment for prenatal care and delivery

## 2024-04-04 NOTE — HISTORY OF PRESENT ILLNESS
[FreeTextEntry1] : Patient is 33 years old para 0-0-2-0 last menstrual period February 13, 2024 She has a history of 32-day menstrual cycles Presents for follow-up

## 2024-04-04 NOTE — PROCEDURE
[Amenorrhea] : Amenorrhea [Transvaginal Ultrasound] : transvaginal ultrasound [Anteverted] : anteverted [No Fibroid(s)] : no fibroid(s) [FreeTextEntry3] : Crown-rump length 0.38 cm, gestational age 6 weeks 0 days, fetal heart motion noted

## 2024-04-04 NOTE — PHYSICAL EXAM
[Chaperone Present] : A chaperone was present in the examining room during all aspects of the physical examination [FreeTextEntry1] : Elba [Labia Majora] : normal [Labia Minora] : normal [Normal] : normal [Uterine Adnexae] : normal

## 2024-04-09 NOTE — HISTORY OF PRESENT ILLNESS
[de-identified] : Ms. Kiah Segovia is a 33 year old woman. She presented to the office for the first time on 01/26/2024 , her primary is Holden Leon. has left buttock mass gets bigger and smaller 2/9/24: Since our procedure, patient has been doing well Wound has healed well 03/22/2024 There was an area that has some purulence underneath that was cleaned, irrigated then it was packed again

## 2024-04-09 NOTE — ADDENDUM
[FreeTextEntry1] :  HERB, Saud Langston, documented this note as a scribe on behalf of Manju Leyva MD on 03/22/2024.

## 2024-04-09 NOTE — PHYSICAL EXAM
[Alert] : alert [Calm] : calm [JVD] : no jugular venous distention  [Purpura] : no purpura  [de-identified] : Normal  [de-identified] : Normal  [de-identified] : Normal  [de-identified] : wound looks ok

## 2024-04-09 NOTE — END OF VISIT
[FreeTextEntry3] : All medical record entries made by the Scribe were at my, Manju Leyva MD, direction and personally dictated by me on 03/22/2024. I have reviewed the chart and agree that the record accurately reflects my personal performance of the history, physical exam, assessment and plan. I have also personally directed, reviewed, and agreed with the chart.

## 2024-04-09 NOTE — ASSESSMENT
[FreeTextEntry1] : Ms. Kiah Segovia is a 33 year old woman. She presented to the office for the first time on 01/26/2024 , her primary is Holden Leon. has left buttock mass gets bigger and smaller 2/9/24: Since our procedure, patient has been doing well Wound has healed well 03/22/2024 There was an area that has some purulence underneath that was cleaned, irrigated then it was packed again  Impression: healing wound   We discussed for over 20 min,  We informed that she needs to follow up next week. We also informed that she can call us if anything changes or has any questions

## 2024-04-10 ENCOUNTER — APPOINTMENT (OUTPATIENT)
Dept: SURGERY | Facility: CLINIC | Age: 34
End: 2024-04-10

## 2024-04-11 ENCOUNTER — APPOINTMENT (OUTPATIENT)
Dept: OBGYN | Facility: CLINIC | Age: 34
End: 2024-04-11
Payer: COMMERCIAL

## 2024-04-11 VITALS
WEIGHT: 204 LBS | SYSTOLIC BLOOD PRESSURE: 106 MMHG | BODY MASS INDEX: 34.83 KG/M2 | DIASTOLIC BLOOD PRESSURE: 74 MMHG | HEIGHT: 64 IN

## 2024-04-11 PROCEDURE — 99213 OFFICE O/P EST LOW 20 MIN: CPT | Mod: 25

## 2024-04-11 PROCEDURE — 99459 PELVIC EXAMINATION: CPT

## 2024-04-11 PROCEDURE — 76817 TRANSVAGINAL US OBSTETRIC: CPT

## 2024-04-11 NOTE — PHYSICAL EXAM
[Chaperone Present] : A chaperone was present in the examining room during all aspects of the physical examination [80656] : A chaperone was present during the pelvic exam. [FreeTextEntry2] : Elba [Labia Majora] : normal [Labia Minora] : normal [Normal] : normal [Uterine Adnexae] : normal

## 2024-04-11 NOTE — PROCEDURE
[Amenorrhea] : Amenorrhea [Transvaginal Ultrasound] : transvaginal ultrasound [Anteverted] : anteverted [No Fibroid(s)] : no fibroid(s) [L: ___ cm] : L: [unfilled] cm [H: ___ cm] : H: [unfilled] cm [FreeTextEntry3] : Crown-rump length 0.96 cm, gestational age 7 weeks 0 days [FreeTextEntry7] : 2.7 x 3.5 cm [FreeTextEntry8] : 2.2 x 3.3 cm

## 2024-04-15 ENCOUNTER — LABORATORY RESULT (OUTPATIENT)
Age: 34
End: 2024-04-15

## 2024-04-16 ENCOUNTER — APPOINTMENT (OUTPATIENT)
Dept: OBGYN | Facility: CLINIC | Age: 34
End: 2024-04-16
Payer: COMMERCIAL

## 2024-04-16 DIAGNOSIS — R87.810 CERVICAL HIGH RISK HUMAN PAPILLOMAVIRUS (HPV) DNA TEST POSITIVE: ICD-10-CM

## 2024-04-16 PROCEDURE — 99214 OFFICE O/P EST MOD 30 MIN: CPT

## 2024-04-19 PROBLEM — R87.810 CERVICAL HIGH RISK HPV (HUMAN PAPILLOMAVIRUS) TEST POSITIVE: Status: ACTIVE | Noted: 2024-04-19

## 2024-05-07 ENCOUNTER — APPOINTMENT (OUTPATIENT)
Dept: OBGYN | Facility: CLINIC | Age: 34
End: 2024-05-07
Payer: COMMERCIAL

## 2024-05-07 VITALS — TEMPERATURE: 97.2 F | WEIGHT: 205 LBS | HEIGHT: 64 IN | BODY MASS INDEX: 35 KG/M2

## 2024-05-07 PROCEDURE — 0500F INITIAL PRENATAL CARE VISIT: CPT

## 2024-05-16 ENCOUNTER — ASOB RESULT (OUTPATIENT)
Age: 34
End: 2024-05-16

## 2024-05-16 ENCOUNTER — NON-APPOINTMENT (OUTPATIENT)
Age: 34
End: 2024-05-16

## 2024-05-16 ENCOUNTER — APPOINTMENT (OUTPATIENT)
Dept: OBGYN | Facility: CLINIC | Age: 34
End: 2024-05-16
Payer: COMMERCIAL

## 2024-05-16 PROCEDURE — 76813 OB US NUCHAL MEAS 1 GEST: CPT | Mod: TC

## 2024-05-16 PROCEDURE — 76813 OB US NUCHAL MEAS 1 GEST: CPT | Mod: 26

## 2024-05-17 ENCOUNTER — LABORATORY RESULT (OUTPATIENT)
Age: 34
End: 2024-05-17

## 2024-05-20 ENCOUNTER — APPOINTMENT (OUTPATIENT)
Dept: INTERNAL MEDICINE | Facility: CLINIC | Age: 34
End: 2024-05-20

## 2024-05-23 ENCOUNTER — NON-APPOINTMENT (OUTPATIENT)
Age: 34
End: 2024-05-23

## 2024-05-23 DIAGNOSIS — Z98.890 OTHER SPECIFIED POSTPROCEDURAL STATES: ICD-10-CM

## 2024-05-23 DIAGNOSIS — Z3A.01 LESS THAN 8 WEEKS GESTATION OF PREGNANCY: ICD-10-CM

## 2024-05-23 DIAGNOSIS — Z32.01 ENCOUNTER FOR PREGNANCY TEST, RESULT POSITIVE: ICD-10-CM

## 2024-05-23 DIAGNOSIS — N91.1 SECONDARY AMENORRHEA: ICD-10-CM

## 2024-05-23 DIAGNOSIS — Z01.419 ENCOUNTER FOR GYNECOLOGICAL EXAMINATION (GENERAL) (ROUTINE) W/OUT ABNORMAL FINDINGS: ICD-10-CM

## 2024-05-23 DIAGNOSIS — Z87.42 PERSONAL HISTORY OF OTHER DISEASES OF THE FEMALE GENITAL TRACT: ICD-10-CM

## 2024-05-23 DIAGNOSIS — Z78.9 OTHER SPECIFIED HEALTH STATUS: ICD-10-CM

## 2024-05-23 DIAGNOSIS — Z86.19 PERSONAL HISTORY OF OTHER INFECTIOUS AND PARASITIC DISEASES: ICD-10-CM

## 2024-05-23 DIAGNOSIS — B96.89 ACUTE VAGINITIS: ICD-10-CM

## 2024-05-23 DIAGNOSIS — N76.0 ACUTE VAGINITIS: ICD-10-CM

## 2024-05-23 DIAGNOSIS — Z01.810 ENCOUNTER FOR PREPROCEDURAL CARDIOVASCULAR EXAMINATION: ICD-10-CM

## 2024-05-29 ENCOUNTER — APPOINTMENT (OUTPATIENT)
Dept: OBGYN | Facility: CLINIC | Age: 34
End: 2024-05-29
Payer: COMMERCIAL

## 2024-05-29 VITALS — BODY MASS INDEX: 34.83 KG/M2 | HEIGHT: 64 IN | WEIGHT: 204 LBS

## 2024-05-29 DIAGNOSIS — N64.1 FAT NECROSIS OF BREAST: ICD-10-CM

## 2024-05-29 DIAGNOSIS — Z87.39 PERSONAL HISTORY OF OTHER DISEASES OF THE MUSCULOSKELETAL SYSTEM AND CONNECTIVE TISSUE: ICD-10-CM

## 2024-05-29 DIAGNOSIS — Z34.90 ENCOUNTER FOR SUPERVISION OF NORMAL PREGNANCY, UNSPECIFIED, UNSPECIFIED TRIMESTER: ICD-10-CM

## 2024-05-29 DIAGNOSIS — Z30.432 ENCOUNTER FOR REMOVAL OF INTRAUTERINE CONTRACEPTIVE DEVICE: ICD-10-CM

## 2024-05-29 DIAGNOSIS — N61.1 ABSCESS OF THE BREAST AND NIPPLE: ICD-10-CM

## 2024-05-29 DIAGNOSIS — B97.7 INFLAMMATORY DISEASE OF CERVIX UTERI: ICD-10-CM

## 2024-05-29 DIAGNOSIS — M54.16 RADICULOPATHY, LUMBAR REGION: ICD-10-CM

## 2024-05-29 DIAGNOSIS — N64.89 OTHER SPECIFIED DISORDERS OF BREAST: ICD-10-CM

## 2024-05-29 DIAGNOSIS — N72 INFLAMMATORY DISEASE OF CERVIX UTERI: ICD-10-CM

## 2024-05-29 DIAGNOSIS — M54.12 RADICULOPATHY, CERVICAL REGION: ICD-10-CM

## 2024-05-29 DIAGNOSIS — R92.8 OTHER ABNORMAL AND INCONCLUSIVE FINDINGS ON DIAGNOSTIC IMAGING OF BREAST: ICD-10-CM

## 2024-05-29 DIAGNOSIS — L72.0 EPIDERMAL CYST: ICD-10-CM

## 2024-05-29 DIAGNOSIS — N61.0 MASTITIS WITHOUT ABSCESS: ICD-10-CM

## 2024-05-29 DIAGNOSIS — R22.2 LOCALIZED SWELLING, MASS AND LUMP, TRUNK: ICD-10-CM

## 2024-05-29 DIAGNOSIS — S21.002D UNSPECIFIED OPEN WOUND OF LEFT BREAST, SUBSEQUENT ENCOUNTER: ICD-10-CM

## 2024-05-29 DIAGNOSIS — Z86.19 PERSONAL HISTORY OF OTHER INFECTIOUS AND PARASITIC DISEASES: ICD-10-CM

## 2024-05-29 LAB
BILIRUB UR QL STRIP: NORMAL
GLUCOSE UR-MCNC: NORMAL
HCG UR QL: 0.2 EU/DL
HGB UR QL STRIP.AUTO: NORMAL
KETONES UR-MCNC: NORMAL
LEUKOCYTE ESTERASE UR QL STRIP: NORMAL
NITRITE UR QL STRIP: NORMAL
PH UR STRIP: 5.5
PROT UR STRIP-MCNC: NORMAL
SP GR UR STRIP: 1

## 2024-05-29 PROCEDURE — 0502F SUBSEQUENT PRENATAL CARE: CPT

## 2024-06-05 ENCOUNTER — APPOINTMENT (OUTPATIENT)
Age: 34
End: 2024-06-05
Payer: COMMERCIAL

## 2024-06-05 ENCOUNTER — LABORATORY RESULT (OUTPATIENT)
Age: 34
End: 2024-06-05

## 2024-06-05 VITALS
HEART RATE: 83 BPM | DIASTOLIC BLOOD PRESSURE: 75 MMHG | BODY MASS INDEX: 34.83 KG/M2 | RESPIRATION RATE: 16 BRPM | WEIGHT: 204 LBS | TEMPERATURE: 98 F | OXYGEN SATURATION: 99 % | HEIGHT: 64 IN | SYSTOLIC BLOOD PRESSURE: 112 MMHG

## 2024-06-05 DIAGNOSIS — D64.9 ANEMIA, UNSPECIFIED: ICD-10-CM

## 2024-06-05 DIAGNOSIS — D50.8 OTHER IRON DEFICIENCY ANEMIAS: ICD-10-CM

## 2024-06-05 LAB
HCT VFR BLD CALC: 28.9 %
HGB BLD-MCNC: 9 G/DL
MCHC RBC-ENTMCNC: 21.6 PG
MCHC RBC-ENTMCNC: 31.1 G/DL
MCV RBC AUTO: 69.3 FL
PLATELET # BLD AUTO: 255 K/UL
PMV BLD: 9.1 FL
RBC # BLD: 4.17 M/UL
RBC # FLD: 17 %
WBC # FLD AUTO: 9.08 K/UL

## 2024-06-05 PROCEDURE — 99204 OFFICE O/P NEW MOD 45 MIN: CPT

## 2024-06-05 NOTE — HISTORY OF PRESENT ILLNESS
[de-identified] : 34 yo woman , 15 weeks pregnant, s/p gastric bypass surgery 2012, is evalauted for iron deficiency anemia  c/o dizziness while ambulating denies chest pressure, SOB, fatigue, palpitations

## 2024-06-05 NOTE — ASSESSMENT
[FreeTextEntry1] : 34 yo woman with iron deficiency anemia, s/p gastric bypass surgery 2012 currently 15 weeks pregnant will repeat labs including iron , vit b12,folate, copper start venofer 1000mg iv

## 2024-06-06 LAB
ALBUMIN SERPL ELPH-MCNC: 3.7 G/DL
ALP BLD-CCNC: 66 U/L
ALT SERPL-CCNC: 12 U/L
ANION GAP SERPL CALC-SCNC: 12 MMOL/L
APTT BLD: 25.5 SEC
AST SERPL-CCNC: 15 U/L
BILIRUB DIRECT SERPL-MCNC: <0.2 MG/DL
BILIRUB INDIRECT SERPL-MCNC: NORMAL MG/DL
BILIRUB SERPL-MCNC: <0.2 MG/DL
BUN SERPL-MCNC: 8 MG/DL
CALCIUM SERPL-MCNC: 8.4 MG/DL
CHLORIDE SERPL-SCNC: 101 MMOL/L
CO2 SERPL-SCNC: 20 MMOL/L
CREAT SERPL-MCNC: 0.5 MG/DL
EGFR: 126 ML/MIN/1.73M2
FERRITIN SERPL-MCNC: 5 NG/ML
FOLATE SERPL-MCNC: 14.9 NG/ML
GLUCOSE SERPL-MCNC: 82 MG/DL
INR PPP: 0.97 RATIO
IRON SATN MFR SERPL: 5 %
IRON SERPL-MCNC: 22 UG/DL
POTASSIUM SERPL-SCNC: 4 MMOL/L
PROT SERPL-MCNC: 6.7 G/DL
PT BLD: 11.1 SEC
SODIUM SERPL-SCNC: 133 MMOL/L
TIBC SERPL-MCNC: 426 UG/DL
UIBC SERPL-MCNC: 404 UG/DL
VIT B12 SERPL-MCNC: 329 PG/ML

## 2024-06-11 LAB
COPPER SERPL-MCNC: 273 UG/DL
METHYLMALONATE SERPL-SCNC: 252 NMOL/L

## 2024-06-14 ENCOUNTER — APPOINTMENT (OUTPATIENT)
Age: 34
End: 2024-06-14

## 2024-06-14 ENCOUNTER — OUTPATIENT (OUTPATIENT)
Dept: OUTPATIENT SERVICES | Facility: HOSPITAL | Age: 34
LOS: 1 days | End: 2024-06-14
Payer: COMMERCIAL

## 2024-06-14 VITALS — HEART RATE: 83 BPM | TEMPERATURE: 98 F | SYSTOLIC BLOOD PRESSURE: 104 MMHG | DIASTOLIC BLOOD PRESSURE: 71 MMHG

## 2024-06-14 DIAGNOSIS — D64.9 ANEMIA, UNSPECIFIED: ICD-10-CM

## 2024-06-14 DIAGNOSIS — Z98.84 BARIATRIC SURGERY STATUS: Chronic | ICD-10-CM

## 2024-06-14 PROCEDURE — 96365 THER/PROPH/DIAG IV INF INIT: CPT

## 2024-06-14 RX ORDER — IRON SUCROSE 20 MG/ML
200 INJECTION, SOLUTION INTRAVENOUS ONCE
Refills: 0 | Status: COMPLETED | OUTPATIENT
Start: 2024-06-14 | End: 2024-06-14

## 2024-06-14 RX ADMIN — IRON SUCROSE 200 MILLIGRAM(S): 20 INJECTION, SOLUTION INTRAVENOUS at 16:40

## 2024-06-14 RX ADMIN — IRON SUCROSE 200 MILLIGRAM(S): 20 INJECTION, SOLUTION INTRAVENOUS at 16:09

## 2024-06-17 ENCOUNTER — OUTPATIENT (OUTPATIENT)
Dept: OUTPATIENT SERVICES | Facility: HOSPITAL | Age: 34
LOS: 1 days | End: 2024-06-17
Payer: COMMERCIAL

## 2024-06-17 ENCOUNTER — APPOINTMENT (OUTPATIENT)
Age: 34
End: 2024-06-17

## 2024-06-17 VITALS — TEMPERATURE: 99 F | SYSTOLIC BLOOD PRESSURE: 104 MMHG | DIASTOLIC BLOOD PRESSURE: 67 MMHG | HEART RATE: 101 BPM

## 2024-06-17 DIAGNOSIS — Z98.84 BARIATRIC SURGERY STATUS: Chronic | ICD-10-CM

## 2024-06-17 DIAGNOSIS — D64.9 ANEMIA, UNSPECIFIED: ICD-10-CM

## 2024-06-17 PROCEDURE — 96365 THER/PROPH/DIAG IV INF INIT: CPT

## 2024-06-17 RX ORDER — IRON SUCROSE 20 MG/ML
200 INJECTION, SOLUTION INTRAVENOUS ONCE
Refills: 0 | Status: COMPLETED | OUTPATIENT
Start: 2024-06-17 | End: 2024-06-17

## 2024-06-17 RX ADMIN — IRON SUCROSE 200 MILLIGRAM(S): 20 INJECTION, SOLUTION INTRAVENOUS at 17:03

## 2024-06-17 RX ADMIN — IRON SUCROSE 200 MILLIGRAM(S): 20 INJECTION, SOLUTION INTRAVENOUS at 17:35

## 2024-06-18 ENCOUNTER — APPOINTMENT (OUTPATIENT)
Dept: OBGYN | Facility: CLINIC | Age: 34
End: 2024-06-18
Payer: COMMERCIAL

## 2024-06-18 VITALS — BODY MASS INDEX: 35 KG/M2 | WEIGHT: 205 LBS | HEIGHT: 64 IN

## 2024-06-18 PROCEDURE — 0502F SUBSEQUENT PRENATAL CARE: CPT

## 2024-06-19 ENCOUNTER — OUTPATIENT (OUTPATIENT)
Dept: OUTPATIENT SERVICES | Facility: HOSPITAL | Age: 34
LOS: 1 days | End: 2024-06-19
Payer: COMMERCIAL

## 2024-06-19 ENCOUNTER — APPOINTMENT (OUTPATIENT)
Age: 34
End: 2024-06-19

## 2024-06-19 VITALS
TEMPERATURE: 98 F | SYSTOLIC BLOOD PRESSURE: 101 MMHG | RESPIRATION RATE: 14 BRPM | DIASTOLIC BLOOD PRESSURE: 70 MMHG | HEART RATE: 87 BPM

## 2024-06-19 DIAGNOSIS — Z98.84 BARIATRIC SURGERY STATUS: Chronic | ICD-10-CM

## 2024-06-19 DIAGNOSIS — D64.9 ANEMIA, UNSPECIFIED: ICD-10-CM

## 2024-06-19 PROCEDURE — 96365 THER/PROPH/DIAG IV INF INIT: CPT

## 2024-06-19 RX ORDER — IRON SUCROSE 20 MG/ML
200 INJECTION, SOLUTION INTRAVENOUS ONCE
Refills: 0 | Status: COMPLETED | OUTPATIENT
Start: 2024-06-19 | End: 2024-06-19

## 2024-06-19 RX ADMIN — IRON SUCROSE 200 MILLIGRAM(S): 20 INJECTION, SOLUTION INTRAVENOUS at 18:10

## 2024-06-19 RX ADMIN — IRON SUCROSE 200 MILLIGRAM(S): 20 INJECTION, SOLUTION INTRAVENOUS at 17:40

## 2024-06-20 ENCOUNTER — LABORATORY RESULT (OUTPATIENT)
Age: 34
End: 2024-06-20

## 2024-06-24 ENCOUNTER — OUTPATIENT (OUTPATIENT)
Dept: OUTPATIENT SERVICES | Facility: HOSPITAL | Age: 34
LOS: 1 days | End: 2024-06-24
Payer: COMMERCIAL

## 2024-06-24 ENCOUNTER — APPOINTMENT (OUTPATIENT)
Age: 34
End: 2024-06-24

## 2024-06-24 VITALS — TEMPERATURE: 98 F | SYSTOLIC BLOOD PRESSURE: 102 MMHG | HEART RATE: 91 BPM | DIASTOLIC BLOOD PRESSURE: 64 MMHG

## 2024-06-24 DIAGNOSIS — Z98.84 BARIATRIC SURGERY STATUS: Chronic | ICD-10-CM

## 2024-06-24 DIAGNOSIS — D64.9 ANEMIA, UNSPECIFIED: ICD-10-CM

## 2024-06-24 PROCEDURE — 96365 THER/PROPH/DIAG IV INF INIT: CPT

## 2024-06-24 RX ORDER — IRON SUCROSE 20 MG/ML
200 INJECTION, SOLUTION INTRAVENOUS ONCE
Refills: 0 | Status: COMPLETED | OUTPATIENT
Start: 2024-06-24 | End: 2024-06-24

## 2024-06-24 RX ADMIN — IRON SUCROSE 200 MILLIGRAM(S): 20 INJECTION, SOLUTION INTRAVENOUS at 17:37

## 2024-06-24 RX ADMIN — IRON SUCROSE 200 MILLIGRAM(S): 20 INJECTION, SOLUTION INTRAVENOUS at 17:06

## 2024-07-01 ENCOUNTER — OUTPATIENT (OUTPATIENT)
Dept: OUTPATIENT SERVICES | Facility: HOSPITAL | Age: 34
LOS: 1 days | End: 2024-07-01
Payer: COMMERCIAL

## 2024-07-01 ENCOUNTER — APPOINTMENT (OUTPATIENT)
Age: 34
End: 2024-07-01

## 2024-07-01 DIAGNOSIS — D64.9 ANEMIA, UNSPECIFIED: ICD-10-CM

## 2024-07-01 DIAGNOSIS — Z98.84 BARIATRIC SURGERY STATUS: Chronic | ICD-10-CM

## 2024-07-01 PROCEDURE — 96365 THER/PROPH/DIAG IV INF INIT: CPT

## 2024-07-01 RX ORDER — IRON SUCROSE 20 MG/ML
200 INJECTION, SOLUTION INTRAVENOUS ONCE
Refills: 0 | Status: COMPLETED | OUTPATIENT
Start: 2024-07-01 | End: 2024-07-01

## 2024-07-01 RX ADMIN — IRON SUCROSE 200 MILLIGRAM(S): 20 INJECTION, SOLUTION INTRAVENOUS at 17:20

## 2024-07-08 ENCOUNTER — APPOINTMENT (OUTPATIENT)
Dept: ANTEPARTUM | Facility: CLINIC | Age: 34
End: 2024-07-08
Payer: COMMERCIAL

## 2024-07-08 ENCOUNTER — ASOB RESULT (OUTPATIENT)
Age: 34
End: 2024-07-08

## 2024-07-08 ENCOUNTER — APPOINTMENT (OUTPATIENT)
Dept: OBGYN | Facility: CLINIC | Age: 34
End: 2024-07-08

## 2024-07-08 VITALS
HEART RATE: 89 BPM | SYSTOLIC BLOOD PRESSURE: 116 MMHG | BODY MASS INDEX: 35.17 KG/M2 | DIASTOLIC BLOOD PRESSURE: 72 MMHG | WEIGHT: 206 LBS | HEIGHT: 64 IN

## 2024-07-08 PROCEDURE — 76817 TRANSVAGINAL US OBSTETRIC: CPT

## 2024-07-08 PROCEDURE — 76811 OB US DETAILED SNGL FETUS: CPT

## 2024-07-10 ENCOUNTER — APPOINTMENT (OUTPATIENT)
Dept: OBGYN | Facility: CLINIC | Age: 34
End: 2024-07-10
Payer: COMMERCIAL

## 2024-07-10 VITALS — BODY MASS INDEX: 34.83 KG/M2 | WEIGHT: 204 LBS | HEIGHT: 64 IN

## 2024-07-10 DIAGNOSIS — Z3A.20 20 WEEKS GESTATION OF PREGNANCY: ICD-10-CM

## 2024-07-10 PROCEDURE — 0502F SUBSEQUENT PRENATAL CARE: CPT

## 2024-07-30 ENCOUNTER — APPOINTMENT (OUTPATIENT)
Age: 34
End: 2024-07-30

## 2024-07-30 ENCOUNTER — NON-APPOINTMENT (OUTPATIENT)
Age: 34
End: 2024-07-30

## 2024-07-30 ENCOUNTER — LABORATORY RESULT (OUTPATIENT)
Age: 34
End: 2024-07-30

## 2024-07-31 ENCOUNTER — APPOINTMENT (OUTPATIENT)
Dept: OBGYN | Facility: CLINIC | Age: 34
End: 2024-07-31

## 2024-07-31 LAB
FERRITIN SERPL-MCNC: 27 NG/ML
HCT VFR BLD CALC: 36.3 %
HGB BLD-MCNC: 11.9 G/DL
IRON SATN MFR SERPL: 15 %
IRON SERPL-MCNC: 54 UG/DL
MCHC RBC-ENTMCNC: 26.6 PG
MCHC RBC-ENTMCNC: 32.8 G/DL
MCV RBC AUTO: 81.2 FL
PLATELET # BLD AUTO: 224 K/UL
PMV BLD: 9.4 FL
RBC # BLD: 4.47 M/UL
RBC # FLD: 22.5 %
TIBC SERPL-MCNC: 362 UG/DL
UIBC SERPL-MCNC: 308 UG/DL
WBC # FLD AUTO: 9.6 K/UL

## 2024-08-01 RX ORDER — VALACYCLOVIR 500 MG/1
500 TABLET, FILM COATED ORAL
Qty: 90 | Refills: 0 | Status: ACTIVE | COMMUNITY
Start: 2024-08-01 | End: 1900-01-01

## 2024-08-05 ENCOUNTER — ASOB RESULT (OUTPATIENT)
Age: 34
End: 2024-08-05

## 2024-08-05 ENCOUNTER — APPOINTMENT (OUTPATIENT)
Age: 34
End: 2024-08-05

## 2024-08-05 ENCOUNTER — APPOINTMENT (OUTPATIENT)
Dept: ANTEPARTUM | Facility: CLINIC | Age: 34
End: 2024-08-05

## 2024-08-05 PROCEDURE — 76816 OB US FOLLOW-UP PER FETUS: CPT

## 2024-08-05 NOTE — HISTORY OF PRESENT ILLNESS
[de-identified] : 34 yo woman , 15 weeks pregnant, s/p gastric bypass surgery 2012, is evalauted for iron deficiency anemia  c/o dizziness while ambulating denies chest pressure, SOB, fatigue, palpitations  [de-identified] : 8/5/24

## 2024-08-05 NOTE — ASSESSMENT
[FreeTextEntry1] : 32 yo woman with iron deficiency anemia, s/p gastric bypass surgery 2012 currently 15 weeks pregnant will repeat labs including iron , vit b12,folate, copper start venofer 1000mg iv

## 2024-08-06 ENCOUNTER — TRANSCRIPTION ENCOUNTER (OUTPATIENT)
Age: 34
End: 2024-08-06

## 2024-08-06 ENCOUNTER — APPOINTMENT (OUTPATIENT)
Dept: OBGYN | Facility: CLINIC | Age: 34
End: 2024-08-06

## 2024-08-06 PROCEDURE — 0502F SUBSEQUENT PRENATAL CARE: CPT

## 2024-08-07 ENCOUNTER — NON-APPOINTMENT (OUTPATIENT)
Age: 34
End: 2024-08-07

## 2024-08-13 ENCOUNTER — APPOINTMENT (OUTPATIENT)
Dept: PEDIATRIC CARDIOLOGY | Facility: CLINIC | Age: 34
End: 2024-08-13
Payer: COMMERCIAL

## 2024-08-13 PROCEDURE — 93325 DOPPLER ECHO COLOR FLOW MAPG: CPT

## 2024-08-13 PROCEDURE — 76827 ECHO EXAM OF FETAL HEART: CPT

## 2024-08-13 PROCEDURE — 76825 ECHO EXAM OF FETAL HEART: CPT

## 2024-08-13 PROCEDURE — 99205 OFFICE O/P NEW HI 60 MIN: CPT

## 2024-08-13 NOTE — DISCUSSION/SUMMARY
[FreeTextEntry1] : Normal study. Reassurance. Recommend routine prenatal care and delivery.  Please do not hesitate to contact me if you have any questions.   Lawrence Mccartney MD, MS, FAAP, FACC Attending Physician, Pediatric Cardiology [facility] Physician [address], [address] [place] [address] Office: [phone] Fax: [phone] Email: [email or web address]     I have spent 60 minutes of time on the encounter excluding separately reported services.

## 2024-08-13 NOTE — HISTORY OF PRESENT ILLNESS
[FreeTextEntry1] : Dear Dr. Rebollar:   I had the pleasure of seeing your patient, ANN MARIE PÉREZ, in my office today, 08/13/2024. She was referred to pediatric cardiology for fetal echocardiogram.   Limited view of heart. ANN MARIE has been doing well from a clinical standpoint. There is no family history of congenital heart disease.

## 2024-08-16 ENCOUNTER — APPOINTMENT (OUTPATIENT)
Dept: ANTEPARTUM | Facility: CLINIC | Age: 34
End: 2024-08-16
Payer: COMMERCIAL

## 2024-08-16 ENCOUNTER — ASOB RESULT (OUTPATIENT)
Age: 34
End: 2024-08-16

## 2024-08-16 VITALS
WEIGHT: 209 LBS | SYSTOLIC BLOOD PRESSURE: 118 MMHG | HEART RATE: 92 BPM | HEIGHT: 64 IN | BODY MASS INDEX: 35.68 KG/M2 | DIASTOLIC BLOOD PRESSURE: 80 MMHG

## 2024-08-16 PROCEDURE — 76816 OB US FOLLOW-UP PER FETUS: CPT

## 2024-08-17 ENCOUNTER — LABORATORY RESULT (OUTPATIENT)
Age: 34
End: 2024-08-17

## 2024-08-22 ENCOUNTER — NON-APPOINTMENT (OUTPATIENT)
Age: 34
End: 2024-08-22

## 2024-08-28 ENCOUNTER — APPOINTMENT (OUTPATIENT)
Dept: OBGYN | Facility: CLINIC | Age: 34
End: 2024-08-28
Payer: COMMERCIAL

## 2024-08-28 VITALS
HEIGHT: 64 IN | SYSTOLIC BLOOD PRESSURE: 118 MMHG | WEIGHT: 209 LBS | BODY MASS INDEX: 35.68 KG/M2 | DIASTOLIC BLOOD PRESSURE: 77 MMHG

## 2024-08-28 DIAGNOSIS — Z3A.27 27 WEEKS GESTATION OF PREGNANCY: ICD-10-CM

## 2024-08-28 LAB
APPEARANCE: CLEAR
BILIRUBIN URINE: NEGATIVE
BLOOD URINE: NEGATIVE
COLOR: YELLOW
GLUCOSE QUALITATIVE U: NEGATIVE
KETONES URINE: 80
LEUKOCYTE ESTERASE URINE: NEGATIVE
NITRITE URINE: NEGATIVE
PH URINE: 5.5
PROTEIN URINE: NEGATIVE
SPECIFIC GRAVITY URINE: 1.02
UROBILINOGEN URINE: 0.2 (ref 0.2–?)

## 2024-08-28 PROCEDURE — 59426 ANTEPARTUM CARE ONLY: CPT

## 2024-08-28 PROCEDURE — 0502F SUBSEQUENT PRENATAL CARE: CPT

## 2024-09-10 ENCOUNTER — APPOINTMENT (OUTPATIENT)
Dept: OBGYN | Facility: CLINIC | Age: 34
End: 2024-09-10
Payer: COMMERCIAL

## 2024-09-10 ENCOUNTER — NON-APPOINTMENT (OUTPATIENT)
Age: 34
End: 2024-09-10

## 2024-09-10 VITALS — TEMPERATURE: 97.3 F | WEIGHT: 207 LBS | HEIGHT: 64 IN | BODY MASS INDEX: 35.34 KG/M2

## 2024-09-10 LAB
APPEARANCE: CLEAR
BILIRUBIN URINE: NEGATIVE
BLOOD URINE: NEGATIVE
COLOR: YELLOW
GLUCOSE QUALITATIVE U: NEGATIVE
KETONES URINE: NEGATIVE
LEUKOCYTE ESTERASE URINE: NEGATIVE
NITRITE URINE: NEGATIVE
PH URINE: 6
PROTEIN URINE: NEGATIVE
SPECIFIC GRAVITY URINE: >=1.03
UROBILINOGEN URINE: 0.2 (ref 0.2–?)

## 2024-09-10 PROCEDURE — 0502F SUBSEQUENT PRENATAL CARE: CPT

## 2024-09-12 ENCOUNTER — NON-APPOINTMENT (OUTPATIENT)
Age: 34
End: 2024-09-12

## 2024-09-12 DIAGNOSIS — O14.90 UNSPECIFIED PRE-ECLAMPSIA, UNSPECIFIED TRIMESTER: ICD-10-CM

## 2024-09-12 RX ORDER — PRENATAL VIT NO.126/IRON/FOLIC 28MG-0.8MG
28-0.8 TABLET ORAL DAILY
Refills: 0 | Status: ACTIVE | COMMUNITY
Start: 2024-09-12

## 2024-09-13 ENCOUNTER — NON-APPOINTMENT (OUTPATIENT)
Age: 34
End: 2024-09-13

## 2024-09-13 RX ORDER — NIFEDIPINE 60 MG/1
60 TABLET, EXTENDED RELEASE ORAL DAILY
Refills: 0 | Status: DISCONTINUED | COMMUNITY
Start: 2024-09-12 | End: 2024-09-13

## 2024-09-13 RX ORDER — VALACYCLOVIR HYDROCHLORIDE 500 MG/1
500 TABLET, FILM COATED ORAL DAILY
Refills: 0 | Status: ACTIVE | COMMUNITY
Start: 2024-09-13

## 2024-09-13 RX ORDER — NIFEDIPINE 30 MG/1
30 TABLET, FILM COATED, EXTENDED RELEASE ORAL AT BEDTIME
Refills: 0 | Status: DISCONTINUED | COMMUNITY
Start: 2024-09-12 | End: 2024-09-13

## 2024-09-27 ENCOUNTER — LABORATORY RESULT (OUTPATIENT)
Age: 34
End: 2024-09-27

## 2024-09-30 ENCOUNTER — NON-APPOINTMENT (OUTPATIENT)
Age: 34
End: 2024-09-30

## 2024-10-01 ENCOUNTER — APPOINTMENT (OUTPATIENT)
Dept: ANTEPARTUM | Facility: CLINIC | Age: 34
End: 2024-10-01
Payer: COMMERCIAL

## 2024-10-01 ENCOUNTER — ASOB RESULT (OUTPATIENT)
Age: 34
End: 2024-10-01

## 2024-10-01 VITALS
DIASTOLIC BLOOD PRESSURE: 76 MMHG | HEART RATE: 92 BPM | SYSTOLIC BLOOD PRESSURE: 122 MMHG | BODY MASS INDEX: 35.85 KG/M2 | WEIGHT: 210 LBS | HEIGHT: 64 IN

## 2024-10-01 PROCEDURE — 76816 OB US FOLLOW-UP PER FETUS: CPT

## 2024-10-02 ENCOUNTER — APPOINTMENT (OUTPATIENT)
Dept: OBGYN | Facility: CLINIC | Age: 34
End: 2024-10-02
Payer: COMMERCIAL

## 2024-10-02 VITALS
BODY MASS INDEX: 36.54 KG/M2 | WEIGHT: 214 LBS | HEIGHT: 64 IN | SYSTOLIC BLOOD PRESSURE: 119 MMHG | DIASTOLIC BLOOD PRESSURE: 78 MMHG

## 2024-10-02 DIAGNOSIS — Z34.90 ENCOUNTER FOR SUPERVISION OF NORMAL PREGNANCY, UNSPECIFIED, UNSPECIFIED TRIMESTER: ICD-10-CM

## 2024-10-02 LAB
APPEARANCE: CLEAR
BILIRUBIN URINE: NEGATIVE
BLOOD URINE: NEGATIVE
COLOR: YELLOW
GLUCOSE QUALITATIVE U: NEGATIVE
KETONES URINE: NEGATIVE
LEUKOCYTE ESTERASE URINE: NEGATIVE
NITRITE URINE: NEGATIVE
PH URINE: 6
PROTEIN URINE: NEGATIVE
SPECIFIC GRAVITY URINE: 1.01
UROBILINOGEN URINE: 0.2 (ref 0.2–?)

## 2024-10-02 PROCEDURE — 0502F SUBSEQUENT PRENATAL CARE: CPT

## 2024-10-07 ENCOUNTER — NON-APPOINTMENT (OUTPATIENT)
Age: 34
End: 2024-10-07

## 2024-10-14 ENCOUNTER — NON-APPOINTMENT (OUTPATIENT)
Age: 34
End: 2024-10-14

## 2024-10-14 ENCOUNTER — APPOINTMENT (OUTPATIENT)
Dept: OBGYN | Facility: CLINIC | Age: 34
End: 2024-10-14
Payer: COMMERCIAL

## 2024-10-14 VITALS — BODY MASS INDEX: 36.02 KG/M2 | HEIGHT: 64 IN | TEMPERATURE: 97 F | WEIGHT: 211 LBS

## 2024-10-14 LAB
APPEARANCE: CLEAR
BILIRUBIN URINE: NEGATIVE
BLOOD URINE: NEGATIVE
COLOR: YELLOW
GLUCOSE QUALITATIVE U: NEGATIVE
KETONES URINE: NEGATIVE
LEUKOCYTE ESTERASE URINE: NEGATIVE
NITRITE URINE: NEGATIVE
PH URINE: 7
PROTEIN URINE: NEGATIVE
SPECIFIC GRAVITY URINE: <=1.005
UROBILINOGEN URINE: 0.2 (ref 0.2–?)

## 2024-10-14 PROCEDURE — 0502F SUBSEQUENT PRENATAL CARE: CPT

## 2024-10-15 ENCOUNTER — NON-APPOINTMENT (OUTPATIENT)
Age: 34
End: 2024-10-15

## 2024-10-16 ENCOUNTER — APPOINTMENT (OUTPATIENT)
Dept: ANTEPARTUM | Facility: CLINIC | Age: 34
End: 2024-10-16
Payer: COMMERCIAL

## 2024-10-16 ENCOUNTER — ASOB RESULT (OUTPATIENT)
Age: 34
End: 2024-10-16

## 2024-10-16 VITALS
HEIGHT: 64 IN | WEIGHT: 212 LBS | SYSTOLIC BLOOD PRESSURE: 121 MMHG | BODY MASS INDEX: 36.19 KG/M2 | HEART RATE: 101 BPM | DIASTOLIC BLOOD PRESSURE: 78 MMHG

## 2024-10-16 PROCEDURE — 76819 FETAL BIOPHYS PROFIL W/O NST: CPT

## 2024-10-17 ENCOUNTER — NON-APPOINTMENT (OUTPATIENT)
Age: 34
End: 2024-10-17

## 2024-10-23 ENCOUNTER — APPOINTMENT (OUTPATIENT)
Dept: ANTEPARTUM | Facility: CLINIC | Age: 34
End: 2024-10-23
Payer: COMMERCIAL

## 2024-10-23 ENCOUNTER — ASOB RESULT (OUTPATIENT)
Age: 34
End: 2024-10-23

## 2024-10-23 ENCOUNTER — NON-APPOINTMENT (OUTPATIENT)
Age: 34
End: 2024-10-23

## 2024-10-23 VITALS
BODY MASS INDEX: 35.85 KG/M2 | WEIGHT: 210 LBS | HEART RATE: 89 BPM | SYSTOLIC BLOOD PRESSURE: 118 MMHG | HEIGHT: 64 IN | DIASTOLIC BLOOD PRESSURE: 78 MMHG

## 2024-10-23 PROCEDURE — 76819 FETAL BIOPHYS PROFIL W/O NST: CPT
